# Patient Record
Sex: FEMALE | Race: OTHER | HISPANIC OR LATINO | Employment: FULL TIME | URBAN - METROPOLITAN AREA
[De-identification: names, ages, dates, MRNs, and addresses within clinical notes are randomized per-mention and may not be internally consistent; named-entity substitution may affect disease eponyms.]

---

## 2023-10-19 ENCOUNTER — PHARMACY VISIT (OUTPATIENT)
Dept: PHARMACY | Facility: CLINIC | Age: 48
End: 2023-10-19
Payer: COMMERCIAL

## 2023-10-19 PROCEDURE — RXMED WILLOW AMBULATORY MEDICATION CHARGE

## 2023-10-19 NOTE — TELEPHONE ENCOUNTER
LOV: 9/27/23  NOV: 1/09/24  Patient requesting refill on Mounjaro 10mg/week   Script pended below

## 2023-10-20 ENCOUNTER — TELEPHONE (OUTPATIENT)
Dept: ENDOCRINOLOGY | Facility: CLINIC | Age: 48
End: 2023-10-20
Payer: COMMERCIAL

## 2023-10-20 RX ORDER — TIRZEPATIDE 10 MG/.5ML
10 INJECTION, SOLUTION SUBCUTANEOUS
Qty: 2 ML | Refills: 3 | Status: SHIPPED | OUTPATIENT
Start: 2023-10-20 | End: 2024-01-09 | Stop reason: SDUPTHER

## 2023-10-20 NOTE — TELEPHONE ENCOUNTER
Copied from CRM #[id number]. Topic: Appointment Scheduled  >> Oct 17, 2023  3:00 PM Sharmaine MACIEL wrote:  Pt is scheduled for a group appt with Dr. Yen on 01/09/24 and she would like to make that virtual. Please contact to advise.    Patient's appointment changed to virtual

## 2023-10-23 ENCOUNTER — APPOINTMENT (OUTPATIENT)
Dept: RADIOLOGY | Facility: CLINIC | Age: 48
End: 2023-10-23
Payer: COMMERCIAL

## 2023-10-23 ENCOUNTER — HOSPITAL ENCOUNTER (OUTPATIENT)
Dept: VASCULAR MEDICINE | Facility: CLINIC | Age: 48
Discharge: HOME | End: 2023-10-23
Payer: COMMERCIAL

## 2023-10-23 DIAGNOSIS — R42 DIZZINESS AND GIDDINESS: ICD-10-CM

## 2023-10-23 DIAGNOSIS — H54.7 VISION LOSS: Primary | ICD-10-CM

## 2023-10-23 DIAGNOSIS — H54.7 VISION LOSS: ICD-10-CM

## 2023-10-23 PROCEDURE — 93880 EXTRACRANIAL BILAT STUDY: CPT | Performed by: STUDENT IN AN ORGANIZED HEALTH CARE EDUCATION/TRAINING PROGRAM

## 2023-10-23 PROCEDURE — 93880 EXTRACRANIAL BILAT STUDY: CPT

## 2023-10-24 ENCOUNTER — APPOINTMENT (OUTPATIENT)
Dept: OPHTHALMOLOGY | Facility: CLINIC | Age: 48
End: 2023-10-24
Payer: COMMERCIAL

## 2023-11-22 ENCOUNTER — OFFICE VISIT (OUTPATIENT)
Dept: ORTHOPEDIC SURGERY | Facility: CLINIC | Age: 48
End: 2023-11-22
Payer: COMMERCIAL

## 2023-11-22 DIAGNOSIS — M17.0 BILATERAL PRIMARY OSTEOARTHRITIS OF KNEE: Primary | ICD-10-CM

## 2023-11-22 PROCEDURE — 1036F TOBACCO NON-USER: CPT | Performed by: PHYSICIAN ASSISTANT

## 2023-11-22 PROCEDURE — 20610 DRAIN/INJ JOINT/BURSA W/O US: CPT | Mod: 50 | Performed by: PHYSICIAN ASSISTANT

## 2023-11-22 PROCEDURE — 2500000004 HC RX 250 GENERAL PHARMACY W/ HCPCS (ALT 636 FOR OP/ED): Performed by: PHYSICIAN ASSISTANT

## 2023-11-22 PROCEDURE — 20610 DRAIN/INJ JOINT/BURSA W/O US: CPT | Performed by: PHYSICIAN ASSISTANT

## 2023-11-22 RX ORDER — TRIAMCINOLONE ACETONIDE 40 MG/ML
40 INJECTION, SUSPENSION INTRA-ARTICULAR; INTRAMUSCULAR
Status: COMPLETED | OUTPATIENT
Start: 2023-11-22 | End: 2023-11-22

## 2023-11-22 RX ADMIN — TRIAMCINOLONE ACETONIDE 40 MG: 40 INJECTION, SUSPENSION INTRA-ARTICULAR; INTRAMUSCULAR at 08:21

## 2023-11-22 NOTE — PROGRESS NOTES
Subjective    Patient ID: Niya Guerrero is a 48 y.o. female.    Chief Complaint: B/L knee pain         HPI:  Niya Guerrero is a 48 y.o. female with known osteoarthritis in both knees.  She has started having pain again that is worse with activity.  She is requesting repeat intra-articular steroid injections.    ROS  Constitutional: No fever, no chills, not feeling tired, no recent weight gain and no recent weight loss  ENT: No nosebleeds  Cardiovascular: No chest pain  Respiratory: No shortness of breath and no cough  Gastrointestinal: No abdominal pain, no nausea, no diarrhea, and no vomiting  Musculoskeletal: No arthralgias  Integumentary: No rashes and no skin lesions  Neurological: No headache  Psychiatric: No sleep disturbances no depression  Endocrine: No muscle weakness and no muscle cramps  Hematologic/lymphatic: No swelling glands and no tendency for easy bruising    Past Medical History:   Diagnosis Date    Obstructive sleep apnea (adult) (pediatric) 02/23/2017    Obstructive sleep apnea    Personal history of diseases of the skin and subcutaneous tissue 02/04/2015    History of urticaria    Personal history of other diseases of the musculoskeletal system and connective tissue 02/04/2015    History of arthritis    Personal history of other endocrine, nutritional and metabolic disease 01/22/2015    History of hypokalemia    Sprain of medial collateral ligament of unspecified knee, initial encounter 04/22/2014    Tear of MCL (medial collateral ligament) of knee    Unspecified tear of unspecified meniscus, current injury, unspecified knee, initial encounter 04/22/2014    Meniscus tear        Past Surgical History:   Procedure Laterality Date    HYSTEROSCOPY  01/22/2015    Hysteroscopy With Endometrial Ablation    KNEE ARTHROSCOPY W/ DEBRIDEMENT  04/22/2014    Knee Arthroscopy (Therapeutic)          Current Outpatient Medications:     atorvastatin (Lipitor) 20 mg tablet, TAKE 1 TABLET BY MOUTH DAILY AS DIRECTED,  Disp: 90 tablet, Rfl: 3    metFORMIN XR (Glucophage-XR) 500 mg 24 hr tablet, TAKE 3 TABLETS BY MOUTH DAILY AS DIRECTED, Disp: 270 tablet, Rfl: 3    tirzepatide (Mounjaro) 10 mg/0.5 mL pen injector, Inject 10 mg (0.5 mL) under the skin every 7 days., Disp: 2 mL, Rfl: 3    tirzepatide 10 mg/0.5 mL pen injector, INJECT 10 MG UNDER THE SKIN ONCE A WEEK, Disp: 8 mL, Rfl: 3    tirzepatide 10 mg/0.5 mL pen injector, INJECT 10 MG UNDER THE SKIN ONCE WEEKLY, Disp: 6 mL, Rfl: 1     Not on File     Social Connections: Not on file          Objective   48-year-old female well appearing in no acute distress. Alert and oriented ×3.  Skin intact bilateral lower extremities.   Normal tandem gait. Coordination and balance intact.  Bilateral lower extremity compartments supple.  2+ DP/PT pulses bilaterally.  Injection site both knees is clear    L Inj/Asp: bilateral knee on 11/22/2023 8:21 AM  Indications: pain  Details: 22 G needle, superolateral approach  Medications (Right): 40 mg triamcinolone acetonide 40 mg/mL  Medications (Left): 40 mg triamcinolone acetonide 40 mg/mL      Right knee intra-articular injection is offered for therapeutic purposes.  Indication is knee pain.  Risks and benefits of the injection were discussed.  After questions were answered, consent was provided to proceed.  Under sterile conditions, the knee was injected through a superolateral patellar site with 40 mg Kenalog and 5 cc lidocaine without complication.  The patient tolerated the injection well.  A dressing was applied.  Post injection instructions were given.    Left knee intra-articular injection is offered for therapeutic purposes.  Indication is knee pain.  Risks and benefits of the injection were discussed.  After questions were answered, consent was provided to proceed.  Under sterile conditions, the knee was injected through a superolateral patellar site with 40 mg Kenalog and 5 cc lidocaine without complication.  The patient tolerated the  injection well.  A dressing was applied.  Post injection instructions were given.                Assessment/Plan   Encounter Diagnoses:  Bilateral knee osteoarthritis    Hopefully today's injections will provide her with some relief.  We discussed that we can repeat injections in 3 months if needed.  She will call with any questions or concerns.    This office note was dictated using Dragon voice to text software and was not proofread for spelling or grammatical errors

## 2023-12-29 DIAGNOSIS — E11.9 DIABETES MELLITUS TYPE II, NON INSULIN DEPENDENT (MULTI): Primary | ICD-10-CM

## 2023-12-29 RX ORDER — METFORMIN HYDROCHLORIDE 500 MG/1
TABLET, EXTENDED RELEASE ORAL
Qty: 270 TABLET | Refills: 3 | Status: SHIPPED | OUTPATIENT
Start: 2023-12-29 | End: 2024-12-27

## 2024-01-08 NOTE — PROGRESS NOTES
History of Present IllnessMsJeimy BARBER is a 48 year year old female with a history of T2DM, HLD, GERD, eczema, s/p knee surgeries bilaterally, MELANI who presents for weight management.     VIRTUAL VISIT:  Consult by Dr. Jonny Shipley.  she works as a supervisor for heart/vascular scheduling.     1. Nutrition: working on Mediterranean diet. Still using portion plate. Did not over indulge over the holidays. Trying to increase new vegetables into her diet.     2. Sleep: stable      3. Stress: Has had less stress at work      4. Exercise: walking the dog at the park or stores     5. Appetite control: controlled   AOM currently taking: Mounjaro 10mg weekly- Has T2DM     6. Goal: Try more new vegetables      Current Outpatient Medications:     atorvastatin (Lipitor) 20 mg tablet, TAKE 1 TABLET BY MOUTH DAILY AS DIRECTED, Disp: 90 tablet, Rfl: 3    metFORMIN XR (Glucophage-XR) 500 mg 24 hr tablet, TAKE 3 TABLETS BY MOUTH DAILY AS DIRECTED, Disp: 270 tablet, Rfl: 3    tirzepatide (Mounjaro) 10 mg/0.5 mL pen injector, Inject 10 mg (0.5 mL) under the skin every 7 days., Disp: 2 mL, Rfl: 3    tirzepatide 10 mg/0.5 mL pen injector, INJECT 10 MG UNDER THE SKIN ONCE A WEEK, Disp: 8 mL, Rfl: 3    tirzepatide 10 mg/0.5 mL pen injector, INJECT 10 MG UNDER THE SKIN ONCE WEEKLY, Disp: 6 mL, Rfl: 1    ROS:  System: normal  Eyes : no visual changes  Neck : no tenderness, no new lumps/bumps  Respiratory : no SOB  Cardiovascular : no chest pain, no palpitations  Gastro-Intestinal : no abdominal concerns  Neurological : no numbness or tingling in the extremities  Musculoskeletal : no joint paint, no muscle pain  Skin : no unusual rashes  Psychiatric : no depression, no anxiety  See HPI for Endocrine ROS    Past Medical History:   Diagnosis Date    Obstructive sleep apnea (adult) (pediatric) 02/23/2017    Obstructive sleep apnea    Personal history of diseases of the skin and subcutaneous tissue 02/04/2015    History of urticaria    Personal  history of other diseases of the musculoskeletal system and connective tissue 02/04/2015    History of arthritis    Personal history of other endocrine, nutritional and metabolic disease 01/22/2015    History of hypokalemia    Sprain of medial collateral ligament of unspecified knee, initial encounter 04/22/2014    Tear of MCL (medial collateral ligament) of knee    Unspecified tear of unspecified meniscus, current injury, unspecified knee, initial encounter 04/22/2014    Meniscus tear       Past Surgical History:   Procedure Laterality Date    HYSTEROSCOPY  01/22/2015    Hysteroscopy With Endometrial Ablation    KNEE ARTHROSCOPY W/ DEBRIDEMENT  04/22/2014    Knee Arthroscopy (Therapeutic)       Social History     Socioeconomic History    Marital status:      Spouse name: Not on file    Number of children: Not on file    Years of education: Not on file    Highest education level: Not on file   Occupational History    Not on file   Tobacco Use    Smoking status: Never    Smokeless tobacco: Never   Substance and Sexual Activity    Alcohol use: Not on file    Drug use: Not on file    Sexual activity: Not on file   Other Topics Concern    Not on file   Social History Narrative    Not on file     Social Determinants of Health     Financial Resource Strain: Not on file   Food Insecurity: Not on file   Transportation Needs: Not on file   Physical Activity: Not on file   Stress: Not on file   Social Connections: Not on file   Intimate Partner Violence: Not on file   Housing Stability: Not on file       Objective    Physical Exam:  There were no vitals taken for this visit.  General : alert and oriented X3, no acute distress  Eyes : EOMI     Provider Impressions     Ms. BARBER is a 48 year year old female with a history of T2DM, HLD, GERD, eczema, s/p knee surgeries bilaterally, MELANI who presents for weight management.     Consult by Dr. Jonny Shipley.  she works as a supervisor for heart/vascular scheduling.     was  placed on Ozempic - and initially it did help with weight loss.  initially was 227lb, and then went down to 195lb.  she is now stuck there.     1. Weight Management : Reviewed the principles of energy metabolism, caloric intake and expenditure, and rationale for a treatment program. Also reinforced need for reduced calorie, low fat diet and increased physical activity.     We reviewed the possibility of starting an interdisciplinary lifestyle intervention program involving improvement of the diet, a personalized exercise program, efforts to reduce the stress and the possibility of using appetite suppressant medications in an effort to help with the weight loss process. The patient expressed interest in the plan.     2. Nutrition : I discussed trying one of the diet approaches we have here in the program : Mediterranean lifestyle, ketosis diet.  consult to Taylor : Medit diet -- saw on 4/19/23.  is eating premade salads for lunch -- so is consistent with that.     3/29/23 : 199lb, 33.16kg/m2  5/9/23 : 193lb, 32.2kg/m2  6/14/23 : 192lb, 31.97kg/m2  7/26/23: 182.8lb, 30.37kg/m2  9/27/23: 176.6lb, 29.35kg/m2  1/9/24: 171.8lb, 28.59kg/m2     3. Sleep : is fine.     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  Stress is better.     5. Exercise : not as much exercise this month.     6. Appetite : stable.     7. DM : Managed by Dr. Shipley.  switched from ozempic 2mg/wk to Mounjaro now on 10mg/wk     8. Goal : to add more veggies.     FU in a group visit.  Philip Peres MD

## 2024-01-09 ENCOUNTER — TELEPHONE (OUTPATIENT)
Dept: PHARMACY | Facility: HOSPITAL | Age: 49
End: 2024-01-09

## 2024-01-09 ENCOUNTER — OFFICE VISIT (OUTPATIENT)
Dept: ENDOCRINOLOGY | Facility: CLINIC | Age: 49
End: 2024-01-09
Payer: COMMERCIAL

## 2024-01-09 VITALS — WEIGHT: 171.8 LBS | HEIGHT: 65 IN | BODY MASS INDEX: 28.62 KG/M2

## 2024-01-09 DIAGNOSIS — E66.3 OVERWEIGHT: ICD-10-CM

## 2024-01-09 DIAGNOSIS — Z76.89 ENCOUNTER FOR WEIGHT MANAGEMENT: ICD-10-CM

## 2024-01-09 PROCEDURE — 1036F TOBACCO NON-USER: CPT | Performed by: INTERNAL MEDICINE

## 2024-01-09 PROCEDURE — 3008F BODY MASS INDEX DOCD: CPT | Performed by: INTERNAL MEDICINE

## 2024-01-09 PROCEDURE — 99215 OFFICE O/P EST HI 40 MIN: CPT | Performed by: INTERNAL MEDICINE

## 2024-01-09 RX ORDER — BISMUTH SUBSALICYLATE 262 MG
1 TABLET,CHEWABLE ORAL DAILY
COMMUNITY

## 2024-01-09 NOTE — TELEPHONE ENCOUNTER
[facility] ([place])    Niya Guerrero is a 48 y.o. female was contacted by Clinical Pharmacy Team to complete a comprehensive medication review (CMR) with a pharmacist as part of the [employer] diabetes program.      No Known Allergies    [facility]  [address], [address]  [facility] [address]  Phone: [phone] Fax: [phone]    Updated patient medications and allergy list. Patient reported FBG readings ranging from 86-93, and 130 after lunch.       LAB  Lab Results   Component Value Date    BILITOT 0.6 02/01/2023    CALCIUM 9.9 02/01/2023    CO2 30 02/01/2023     02/01/2023    CREATININE 0.75 02/01/2023    GLUCOSE 94 02/01/2023    ALKPHOS 91 02/01/2023    K 4.2 02/01/2023    PROT 7.7 02/01/2023     02/01/2023    AST 15 02/01/2023    ALT 26 02/01/2023    BUN 14 02/01/2023    ANIONGAP 10 02/01/2023    ALBUMIN 4.2 02/01/2023    GFRF >90 02/01/2023     Lab Results   Component Value Date    TRIG 116 02/01/2023    CHOL 168 02/01/2023    HDL 46.3 02/01/2023     Lab Results   Component Value Date    HGBA1C 5.4 02/01/2023       Current Outpatient Medications on File Prior to Visit   Medication Sig Dispense Refill    atorvastatin (Lipitor) 20 mg tablet TAKE 1 TABLET BY MOUTH DAILY AS DIRECTED 90 tablet 3    metFORMIN XR (Glucophage-XR) 500 mg 24 hr tablet TAKE 3 TABLETS BY MOUTH DAILY AS DIRECTED 270 tablet 3    tirzepatide 10 mg/0.5 mL pen injector INJECT 10 MG UNDER THE SKIN ONCE WEEKLY 6 mL 1    [DISCONTINUED] tirzepatide (Mounjaro) 10 mg/0.5 mL pen injector Inject 10 mg (0.5 mL) under the skin every 7 days. 2 mL 3    [DISCONTINUED] tirzepatide 10 mg/0.5 mL pen injector INJECT 10 MG UNDER THE SKIN ONCE A WEEK 8 mL 3     No current facility-administered medications on file prior to visit.        HISTORICAL DIABETES PHARMACOTHERAPY  -Metformin XR: Patient reported stomach upset. Therapy completed, blood glucose controlled  with Mounjaro.   -Ozempic: Discontinued due to switch to Mounjaro    SECONDARY PREVENTION  - Statin? yes; atorvastatin 20mg  - ACE-I/ARB? no    ASSESSMENT/PLAN:  - Patient enrolled in  Employee diabetes program for $0 co-pays on diabetes medications/supplies. Enrollment should be active in 2-4 weeks   - Requested VBID enrollment date: 1/9/24  - PharmD Management Level: 1    PROBLEM LIST ITEMS ADDRESSED THIS VISIT   -Diabetes   - Continue Moujaro 10mg/0.5ml once weekly   - Continue to monitor blood glucose daily   - Follow up with PCP as directed    Follow up with Clinical Pharmacy Team 1 year for VBID re-enrollment    Continue all meds under the continuation of care with the referring provider and clinical pharmacy team.    Please reach out to the Clinical Pharmacy Team if there are any further questions.     Verbal consent to manage patient's drug therapy was obtained from patient. They were informed they may decline to participate or withdraw from participation in pharmacy services at any time.    Luana Harrison, PharmD  PGY-1 Pharmacy Resident  [phone]

## 2024-01-10 ENCOUNTER — PHARMACY VISIT (OUTPATIENT)
Dept: PHARMACY | Facility: CLINIC | Age: 49
End: 2024-01-10
Payer: COMMERCIAL

## 2024-01-10 PROCEDURE — RXMED WILLOW AMBULATORY MEDICATION CHARGE

## 2024-01-14 PROCEDURE — RXMED WILLOW AMBULATORY MEDICATION CHARGE

## 2024-01-15 ENCOUNTER — PHARMACY VISIT (OUTPATIENT)
Dept: PHARMACY | Facility: CLINIC | Age: 49
End: 2024-01-15
Payer: COMMERCIAL

## 2024-01-31 RX ORDER — TIRZEPATIDE 12.5 MG/.5ML
12.5 INJECTION, SOLUTION SUBCUTANEOUS
Qty: 6 ML | Refills: 2 | Status: SHIPPED | OUTPATIENT
Start: 2024-01-31 | End: 2024-02-01 | Stop reason: SDUPTHER

## 2024-01-31 NOTE — TELEPHONE ENCOUNTER
LOV: 1/9/24  NOV: 4/10/24  Patient on Mounjrao 10mg/week  Patient does not feel effects like before   Patient has been on current dose for around 6 months  Patient asking to increase to 12.5mg  Mounjaro 12.5mg pended below, please review DX

## 2024-02-01 DIAGNOSIS — E11.9 DIABETES MELLITUS TYPE 2, NONINSULIN DEPENDENT (MULTI): ICD-10-CM

## 2024-02-02 ENCOUNTER — PHARMACY VISIT (OUTPATIENT)
Dept: PHARMACY | Facility: CLINIC | Age: 49
End: 2024-02-02
Payer: COMMERCIAL

## 2024-02-02 PROCEDURE — RXMED WILLOW AMBULATORY MEDICATION CHARGE

## 2024-02-02 RX ORDER — TIRZEPATIDE 12.5 MG/.5ML
12.5 INJECTION, SOLUTION SUBCUTANEOUS
Qty: 6 ML | Refills: 2 | Status: SHIPPED | OUTPATIENT
Start: 2024-02-02

## 2024-02-28 PROCEDURE — RXMED WILLOW AMBULATORY MEDICATION CHARGE

## 2024-02-29 ENCOUNTER — PHARMACY VISIT (OUTPATIENT)
Dept: PHARMACY | Facility: CLINIC | Age: 49
End: 2024-02-29
Payer: COMMERCIAL

## 2024-03-06 ENCOUNTER — OFFICE VISIT (OUTPATIENT)
Dept: ORTHOPEDIC SURGERY | Facility: HOSPITAL | Age: 49
End: 2024-03-06
Payer: COMMERCIAL

## 2024-03-06 VITALS — HEIGHT: 65 IN | BODY MASS INDEX: 28.49 KG/M2 | WEIGHT: 171 LBS

## 2024-03-06 DIAGNOSIS — M17.0 BILATERAL PRIMARY OSTEOARTHRITIS OF KNEE: Primary | ICD-10-CM

## 2024-03-06 PROCEDURE — 2500000004 HC RX 250 GENERAL PHARMACY W/ HCPCS (ALT 636 FOR OP/ED): Performed by: PHYSICIAN ASSISTANT

## 2024-03-06 PROCEDURE — 20610 DRAIN/INJ JOINT/BURSA W/O US: CPT | Performed by: PHYSICIAN ASSISTANT

## 2024-03-06 PROCEDURE — 3008F BODY MASS INDEX DOCD: CPT | Performed by: PHYSICIAN ASSISTANT

## 2024-03-06 PROCEDURE — 1036F TOBACCO NON-USER: CPT | Performed by: PHYSICIAN ASSISTANT

## 2024-03-06 PROCEDURE — 20610 DRAIN/INJ JOINT/BURSA W/O US: CPT | Mod: 50 | Performed by: PHYSICIAN ASSISTANT

## 2024-03-06 RX ORDER — TRIAMCINOLONE ACETONIDE 40 MG/ML
40 INJECTION, SUSPENSION INTRA-ARTICULAR; INTRAMUSCULAR
Status: COMPLETED | OUTPATIENT
Start: 2024-03-06 | End: 2024-03-06

## 2024-03-06 RX ADMIN — TRIAMCINOLONE ACETONIDE 40 MG: 400 INJECTION, SUSPENSION INTRA-ARTICULAR; INTRAMUSCULAR at 08:44

## 2024-03-06 ASSESSMENT — PAIN - FUNCTIONAL ASSESSMENT: PAIN_FUNCTIONAL_ASSESSMENT: 0-10

## 2024-03-06 ASSESSMENT — PAIN SCALES - GENERAL: PAINLEVEL_OUTOF10: 4

## 2024-03-06 ASSESSMENT — PAIN DESCRIPTION - DESCRIPTORS: DESCRIPTORS: ACHING

## 2024-03-06 NOTE — PROGRESS NOTES
Subjective    Patient ID: Niya Guerrero is a 48 y.o. female.    Chief Complaint: Follow-up of the Right Knee (DOLI: 11/22/23) and Follow-up of the Left Knee (DOLI: 11/22/23)           HPI:  Niya Guerrero is a 48 y.o. female with known degenerative changes in both knees who returns today for bilateral knee pain.  She last received injections back in November and notes about 3 months of improvement.  She is requesting repeat injections today.    ROS  Constitutional: No fever, no chills, not feeling tired, no recent weight gain and no recent weight loss  ENT: No nosebleeds  Cardiovascular: No chest pain  Respiratory: No shortness of breath and no cough  Gastrointestinal: No abdominal pain, no nausea, no diarrhea, and no vomiting  Musculoskeletal: No arthralgias  Integumentary: No rashes and no skin lesions  Neurological: No headache  Psychiatric: No sleep disturbances no depression  Endocrine: No muscle weakness and no muscle cramps  Hematologic/lymphatic: No swelling glands and no tendency for easy bruising    Past Medical History:   Diagnosis Date    Obstructive sleep apnea (adult) (pediatric) 02/23/2017    Obstructive sleep apnea    Personal history of diseases of the skin and subcutaneous tissue 02/04/2015    History of urticaria    Personal history of other diseases of the musculoskeletal system and connective tissue 02/04/2015    History of arthritis    Personal history of other endocrine, nutritional and metabolic disease 01/22/2015    History of hypokalemia    Sprain of medial collateral ligament of unspecified knee, initial encounter 04/22/2014    Tear of MCL (medial collateral ligament) of knee    Unspecified tear of unspecified meniscus, current injury, unspecified knee, initial encounter 04/22/2014    Meniscus tear        Past Surgical History:   Procedure Laterality Date    HYSTEROSCOPY  01/22/2015    Hysteroscopy With Endometrial Ablation    KNEE ARTHROSCOPY W/ DEBRIDEMENT  04/22/2014    Knee Arthroscopy  (Therapeutic)          Current Outpatient Medications:     atorvastatin (Lipitor) 20 mg tablet, TAKE 1 TABLET BY MOUTH DAILY AS DIRECTED, Disp: 90 tablet, Rfl: 3    metFORMIN XR (Glucophage-XR) 500 mg 24 hr tablet, TAKE 3 TABLETS BY MOUTH DAILY AS DIRECTED, Disp: 270 tablet, Rfl: 3    multivitamin tablet, Take 1 tablet by mouth once daily., Disp: , Rfl:     tirzepatide (Mounjaro) 12.5 mg/0.5 mL pen injector, Inject 12.5 mg under the skin every 7 days., Disp: 6 mL, Rfl: 2    tirzepatide 10 mg/0.5 mL pen injector, INJECT 10 MG UNDER THE SKIN ONCE WEEKLY, Disp: 6 mL, Rfl: 1     Allergies   Allergen Reactions    Penicillin Hives        Social Connections: Not on file          Objective   48-year-old female well appearing in no acute distress. Alert and oriented ×3.  Skin intact bilateral lower extremities.   Normal tandem gait. Coordination and balance intact.  Bilateral lower extremity compartments supple.  2+ DP/PT pulses bilaterally.  Injection site both knees is clear    L Inj/Asp: bilateral knee on 3/6/2024 8:44 AM  Indications: pain  Details: 22 G needle, superolateral approach  Medications (Right): 40 mg triamcinolone acetonide 40 mg/mL  Medications (Left): 40 mg triamcinolone acetonide 40 mg/mL      Knee injection:    Right knee intra-articular injection is offered for therapeutic purposes.  Indication is knee pain.  Risks and benefits of the injection were discussed.  After questions were answered, consent was provided to proceed.  Under sterile conditions, the knee was injected through a superolateral patellar site with 40 mg Kenalog and 5 cc lidocaine without complication.  The patient tolerated the injection well.  A dressing was applied.  Post injection instructions were given.    Left knee intra-articular injection is offered for therapeutic purposes.  Indication is knee pain.  Risks and benefits of the injection were discussed.  After questions were answered, consent was provided to proceed.  Under  sterile conditions, the knee was injected through a superolateral patellar site with 40 mg Kenalog and 5 cc lidocaine without complication.  The patient tolerated the injection well.  A dressing was applied.  Post injection instructions were given.            Assessment/Plan   Encounter Diagnoses:  Bilateral primary osteoarthritis of knee    No orders of the defined types were placed in this encounter.      Hopefully she will have improvement with today's injections.  We discussed other options including viscosupplementation and PRP.  Should she see less than 3 months of improvement with today's procedures will consider the other options.  She will call with any questions or concerns.    This office note was dictated using Dragon voice to text software and was not proofread for spelling or grammatical errors

## 2024-03-21 PROCEDURE — RXMED WILLOW AMBULATORY MEDICATION CHARGE

## 2024-03-23 ENCOUNTER — PHARMACY VISIT (OUTPATIENT)
Dept: PHARMACY | Facility: CLINIC | Age: 49
End: 2024-03-23
Payer: COMMERCIAL

## 2024-04-09 NOTE — PROGRESS NOTES
History of Present IllnessMsJeimy BARBER is a 48 year year old female with a history of T2DM, HLD, GERD, eczema, s/p knee surgeries bilaterally, MELANI who presents for weight management.     VIRTUAL VISIT:  Consult by Dr. Jonny Shipley.  she works as a supervisor for heart/vascular scheduling.     1. Nutrition: working on Mediterranean diet. Making different recipes.     2. Sleep: stable       3. Stress: Has had less stress at work       4. Exercise: started kickboxing and bungee exercise classes       5. Appetite control: controlled   AOM currently taking: Mounjaro 12.5mg weekly- Has T2DM      6. Goal: stay consistent with workouts and incorporate more vegetables into diet.       Current Outpatient Medications:     atorvastatin (Lipitor) 20 mg tablet, TAKE 1 TABLET BY MOUTH DAILY AS DIRECTED, Disp: 90 tablet, Rfl: 3    metFORMIN XR (Glucophage-XR) 500 mg 24 hr tablet, TAKE 3 TABLETS BY MOUTH DAILY AS DIRECTED, Disp: 270 tablet, Rfl: 3    multivitamin tablet, Take 1 tablet by mouth once daily., Disp: , Rfl:     tirzepatide (Mounjaro) 12.5 mg/0.5 mL pen injector, Inject 12.5 mg under the skin every 7 days., Disp: 6 mL, Rfl: 2    tirzepatide 10 mg/0.5 mL pen injector, INJECT 10 MG UNDER THE SKIN ONCE WEEKLY, Disp: 6 mL, Rfl: 1    ROS:  System: normal  Eyes : no visual changes  Neck : no tenderness, no new lumps/bumps  Respiratory : no SOB  Cardiovascular : no chest pain, no palpitations  Gastro-Intestinal : no abdominal concerns  Neurological : no numbness or tingling in the extremities  Musculoskeletal : no joint paint, no muscle pain  Skin : no unusual rashes  Psychiatric : no depression, no anxiety  See HPI for Endocrine ROS    Past Medical History:   Diagnosis Date    Obstructive sleep apnea (adult) (pediatric) 02/23/2017    Obstructive sleep apnea    Personal history of diseases of the skin and subcutaneous tissue 02/04/2015    History of urticaria    Personal history of other diseases of the musculoskeletal system and  connective tissue 02/04/2015    History of arthritis    Personal history of other endocrine, nutritional and metabolic disease 01/22/2015    History of hypokalemia    Sprain of medial collateral ligament of unspecified knee, initial encounter 04/22/2014    Tear of MCL (medial collateral ligament) of knee    Unspecified tear of unspecified meniscus, current injury, unspecified knee, initial encounter 04/22/2014    Meniscus tear       Past Surgical History:   Procedure Laterality Date    HYSTEROSCOPY  01/22/2015    Hysteroscopy With Endometrial Ablation    KNEE ARTHROSCOPY W/ DEBRIDEMENT  04/22/2014    Knee Arthroscopy (Therapeutic)       Social History     Socioeconomic History    Marital status:      Spouse name: Not on file    Number of children: Not on file    Years of education: Not on file    Highest education level: Not on file   Occupational History    Not on file   Tobacco Use    Smoking status: Never    Smokeless tobacco: Never   Vaping Use    Vaping Use: Never used   Substance and Sexual Activity    Alcohol use: Not Currently    Drug use: Never    Sexual activity: Not on file   Other Topics Concern    Not on file   Social History Narrative    Not on file     Social Determinants of Health     Financial Resource Strain: Not on file   Food Insecurity: Not on file   Transportation Needs: Not on file   Physical Activity: Not on file   Stress: Not on file   Social Connections: Not on file   Intimate Partner Violence: Not on file   Housing Stability: Not on file       Objective    Physical Exam:  There were no vitals taken for this visit.  General : alert and oriented X3, no acute distress  Eyes : EOMI     Provider Impressions     Ms. BARBER is a 48 year year old female with a history of T2DM, HLD, GERD, eczema, s/p knee surgeries bilaterally, MELANI who presents for weight management.     Consult by Dr. Jonny Shipley.  she works as a supervisor for heart/vascular scheduling.     was placed on Ozempic - and  initially it did help with weight loss.  initially was 227lb, and then went down to 195lb.  she is now stuck there.     1. Weight Management : Reviewed the principles of energy metabolism, caloric intake and expenditure, and rationale for a treatment program. Also reinforced need for reduced calorie, low fat diet and increased physical activity.     We reviewed the possibility of starting an interdisciplinary lifestyle intervention program involving improvement of the diet, a personalized exercise program, efforts to reduce the stress and the possibility of using appetite suppressant medications in an effort to help with the weight loss process. The patient expressed interest in the plan.     2. Nutrition : I discussed trying one of the diet approaches we have here in the program : Mediterranean lifestyle, ketosis diet.  consult to Taylor : Medit diet -- saw on 4/19/23.  is eating premade salads for lunch -- so is consistent with that.     3/29/23 : 199lb, 33.16kg/m2  5/9/23 : 193lb, 32.2kg/m2  6/14/23 : 192lb, 31.97kg/m2  7/26/23: 182.8lb, 30.37kg/m2  9/27/23: 176.6lb, 29.35kg/m2  1/9/24: 171.8lb, 28.59kg/m2  4/10/24: 169lb, 28.12kg/m2       3. Sleep : is fine.     4. Stress : works in [employer] scheduling, managed   is , has two daughters, one son.  Stress is better.     5. Exercise : Recently started doing kickboxing and bungi exercise classes!     6. Appetite : stable.     7. DM : Managed by Dr. Shipley.  switched from ozempic 2mg/wk to Mounjaro now on 12.5mg/wk     8. Goal : to be consistent with her workouts.     FU in a group visit.  Philip Peres MD

## 2024-04-10 ENCOUNTER — OFFICE VISIT (OUTPATIENT)
Dept: ENDOCRINOLOGY | Facility: CLINIC | Age: 49
End: 2024-04-10
Payer: COMMERCIAL

## 2024-04-10 VITALS — BODY MASS INDEX: 28.16 KG/M2 | WEIGHT: 169 LBS | HEIGHT: 65 IN

## 2024-04-10 DIAGNOSIS — E66.3 OVERWEIGHT: ICD-10-CM

## 2024-04-10 DIAGNOSIS — Z76.89 ENCOUNTER FOR WEIGHT MANAGEMENT: ICD-10-CM

## 2024-04-10 PROCEDURE — 99215 OFFICE O/P EST HI 40 MIN: CPT | Performed by: INTERNAL MEDICINE

## 2024-04-10 PROCEDURE — 3008F BODY MASS INDEX DOCD: CPT | Performed by: INTERNAL MEDICINE

## 2024-04-13 DIAGNOSIS — E78.5 HYPERLIPIDEMIA, UNSPECIFIED HYPERLIPIDEMIA TYPE: Primary | ICD-10-CM

## 2024-04-13 RX ORDER — ATORVASTATIN CALCIUM 20 MG/1
20 TABLET, FILM COATED ORAL DAILY
Qty: 90 TABLET | Refills: 3 | Status: SHIPPED | OUTPATIENT
Start: 2024-04-13 | End: 2025-04-12

## 2024-04-14 PROCEDURE — RXMED WILLOW AMBULATORY MEDICATION CHARGE

## 2024-04-17 ENCOUNTER — PHARMACY VISIT (OUTPATIENT)
Dept: PHARMACY | Facility: CLINIC | Age: 49
End: 2024-04-17
Payer: COMMERCIAL

## 2024-04-18 PROCEDURE — RXMED WILLOW AMBULATORY MEDICATION CHARGE

## 2024-04-20 ENCOUNTER — PHARMACY VISIT (OUTPATIENT)
Dept: PHARMACY | Facility: CLINIC | Age: 49
End: 2024-04-20
Payer: COMMERCIAL

## 2024-05-07 NOTE — PROGRESS NOTES
Subjective   Ms. BARBER is a 48 year year old female with a history of T2DM, HLD, GERD, eczema, s/p knee surgeries bilaterally, MELANI who presents for weight management.     VIRTUAL VISIT:  Consult by Dr. Jonny Shipley.  she works as a supervisor for heart/vascular scheduling.     1. Nutrition: working on Mediterranean diet.     2. Sleep: stable       3. Stress: Has had less stress at work       4. Exercise: Still going to k[employer] classes and walking more     5. Appetite control: controlled   AOM currently taking: Mounjaro 12.5mg weekly- Has T2DM      6. Goal: stay consistent       Current Outpatient Medications:     atorvastatin (Lipitor) 20 mg tablet, TAKE 1 TABLET BY MOUTH DAILY AS DIRECTED, Disp: 90 tablet, Rfl: 3    metFORMIN XR (Glucophage-XR) 500 mg 24 hr tablet, TAKE 3 TABLETS BY MOUTH DAILY AS DIRECTED, Disp: 270 tablet, Rfl: 3    multivitamin tablet, Take 1 tablet by mouth once daily., Disp: , Rfl:     tirzepatide (Mounjaro) 12.5 mg/0.5 mL pen injector, Inject 12.5 mg under the skin every 7 days., Disp: 6 mL, Rfl: 2    ROS:  System: normal  Eyes : no visual changes  Neck : no tenderness, no new lumps/bumps  Respiratory : no SOB  Cardiovascular : no chest pain, no palpitations  Gastro-Intestinal : no abdominal concerns  Neurological : no numbness or tingling in the extremities  Musculoskeletal : no joint paint, no muscle pain  Skin : no unusual rashes  Psychiatric : no depression, no anxiety  See HPI for Endocrine ROS    Past Medical History:   Diagnosis Date    Obstructive sleep apnea (adult) (pediatric) 02/23/2017    Obstructive sleep apnea    Personal history of diseases of the skin and subcutaneous tissue 02/04/2015    History of urticaria    Personal history of other diseases of the musculoskeletal system and connective tissue 02/04/2015    History of arthritis    Personal history of other endocrine, nutritional and metabolic disease 01/22/2015    History of hypokalemia    Sprain of medial collateral  ligament of unspecified knee, initial encounter 04/22/2014    Tear of MCL (medial collateral ligament) of knee    Unspecified tear of unspecified meniscus, current injury, unspecified knee, initial encounter 04/22/2014    Meniscus tear       Past Surgical History:   Procedure Laterality Date    HYSTEROSCOPY  01/22/2015    Hysteroscopy With Endometrial Ablation    KNEE ARTHROSCOPY W/ DEBRIDEMENT  04/22/2014    Knee Arthroscopy (Therapeutic)       Social History     Socioeconomic History    Marital status:      Spouse name: Not on file    Number of children: Not on file    Years of education: Not on file    Highest education level: Not on file   Occupational History    Not on file   Tobacco Use    Smoking status: Never    Smokeless tobacco: Never   Vaping Use    Vaping status: Never Used   Substance and Sexual Activity    Alcohol use: Not Currently    Drug use: Never    Sexual activity: Not on file   Other Topics Concern    Not on file   Social History Narrative    Not on file     Social Determinants of Health     Financial Resource Strain: Not on file   Food Insecurity: Not on file   Transportation Needs: Not on file   Physical Activity: Not on file   Stress: Not on file   Social Connections: Not on file   Intimate Partner Violence: Not on file   Housing Stability: Not on file       Objective    Physical Exam:  There were no vitals taken for this visit.  General : alert and oriented X3, no acute distress  Eyes : EOMI     Provider Impressions  Ms. BARBER is a 48 year year old female with a history of T2DM, HLD, GERD, eczema, s/p knee surgeries bilaterally, MELANI who presents for weight management.     Consult by Dr. Jonny Shipley.  she works as a supervisor for heart/vascular scheduling.     was placed on Ozempic - and initially it did help with weight loss.  initially was 227lb, and then went down to 195lb.  she is now stuck there.     1. Weight Management : Reviewed the principles of energy metabolism, caloric  intake and expenditure, and rationale for a treatment program. Also reinforced need for reduced calorie, low fat diet and increased physical activity.     We reviewed the possibility of starting an interdisciplinary lifestyle intervention program involving improvement of the diet, a personalized exercise program, efforts to reduce the stress and the possibility of using appetite suppressant medications in an effort to help with the weight loss process. The patient expressed interest in the plan.     2. Nutrition : I discussed trying one of the diet approaches we have here in the program : Mediterranean lifestyle, ketosis diet.  consult to Taylor : Medit diet -- saw on 4/19/23.  is eating premade salads for lunch -- so is consistent with that.  Buys these at [employer]!     3/29/23 : 199lb, 33.16kg/m2  5/9/23 : 193lb, 32.2kg/m2  6/14/23 : 192lb, 31.97kg/m2  7/26/23: 182.8lb, 30.37kg/m2  9/27/23: 176.6lb, 29.35kg/m2  1/9/24: 171.8lb, 28.59kg/m2  4/10/24: 169lb, 28.12kg/m2  5/8/24: 169lb, 28.12kg/m2     3. Sleep : is fine.     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  Stress is better.     5. Exercise : Recently started doing kickboxing and bungi exercise classes!     6. Appetite : stable.     7. DM : Managed by Dr. Shipley.  switched from ozempic 2mg/wk to Mounjaro now on 12.5mg/wk     8. Goal : to be consistent, as she is happy with her weight and wants to maintain now.     FU in a group visit.  Philip Peres MD

## 2024-05-08 ENCOUNTER — OFFICE VISIT (OUTPATIENT)
Dept: ENDOCRINOLOGY | Facility: CLINIC | Age: 49
End: 2024-05-08
Payer: COMMERCIAL

## 2024-05-08 VITALS — BODY MASS INDEX: 28.16 KG/M2 | WEIGHT: 169 LBS | HEIGHT: 65 IN

## 2024-05-08 DIAGNOSIS — E66.3 OVERWEIGHT: ICD-10-CM

## 2024-05-08 DIAGNOSIS — Z76.89 ENCOUNTER FOR WEIGHT MANAGEMENT: ICD-10-CM

## 2024-05-08 PROCEDURE — 3008F BODY MASS INDEX DOCD: CPT | Performed by: INTERNAL MEDICINE

## 2024-05-08 PROCEDURE — 99215 OFFICE O/P EST HI 40 MIN: CPT | Performed by: INTERNAL MEDICINE

## 2024-05-14 ENCOUNTER — APPOINTMENT (OUTPATIENT)
Dept: OPHTHALMOLOGY | Facility: CLINIC | Age: 49
End: 2024-05-14
Payer: COMMERCIAL

## 2024-05-16 PROCEDURE — RXMED WILLOW AMBULATORY MEDICATION CHARGE

## 2024-05-18 ENCOUNTER — PHARMACY VISIT (OUTPATIENT)
Dept: PHARMACY | Facility: CLINIC | Age: 49
End: 2024-05-18
Payer: COMMERCIAL

## 2024-05-29 ENCOUNTER — TELEPHONE (OUTPATIENT)
Dept: ENDOCRINOLOGY | Facility: CLINIC | Age: 49
End: 2024-05-29
Payer: COMMERCIAL

## 2024-05-29 DIAGNOSIS — Z00.00 HEALTHCARE MAINTENANCE: ICD-10-CM

## 2024-05-29 NOTE — TELEPHONE ENCOUNTER
----- Message from Niya Guerrero sent at 5/29/2024  1:51 PM EDT -----  Regarding: Blood work  Contact: [phone]  I was wondering if you are able to order blood work to check A1C.

## 2024-06-08 PROCEDURE — RXMED WILLOW AMBULATORY MEDICATION CHARGE

## 2024-06-11 ENCOUNTER — OFFICE VISIT (OUTPATIENT)
Dept: ENDOCRINOLOGY | Facility: CLINIC | Age: 49
End: 2024-06-11
Payer: COMMERCIAL

## 2024-06-11 VITALS — BODY MASS INDEX: 28.32 KG/M2 | HEIGHT: 65 IN | WEIGHT: 170 LBS

## 2024-06-11 DIAGNOSIS — Z76.89 ENCOUNTER FOR WEIGHT MANAGEMENT: ICD-10-CM

## 2024-06-11 DIAGNOSIS — E66.3 OVERWEIGHT: ICD-10-CM

## 2024-06-11 PROCEDURE — 3008F BODY MASS INDEX DOCD: CPT | Performed by: INTERNAL MEDICINE

## 2024-06-11 PROCEDURE — 99215 OFFICE O/P EST HI 40 MIN: CPT | Performed by: INTERNAL MEDICINE

## 2024-06-11 NOTE — PROGRESS NOTES
Subjective   Ms. BARBER is a 48 year year old female with a history of T2DM, HLD, GERD, eczema, s/p knee surgeries bilaterally, MELANI who presents for weight management.     VIRTUAL VISIT:  Consult by Dr. Jonny Shipley.  she works as a supervisor for heart/vascular scheduling.     1. Nutrition: working on Mediterranean diet. Maintaining portion control with meals.      2. Sleep: stable       3. Stress: Has had less stress at work       4. Exercise: Still going to kickboxing classes, walking more and bungee classes      5. Appetite control: controlled   AOM currently taking: Mounjaro 12.5mg weekly- Has T2DM      6. Goal: yard work             Current Outpatient Medications:     atorvastatin (Lipitor) 20 mg tablet, TAKE 1 TABLET BY MOUTH DAILY AS DIRECTED, Disp: 90 tablet, Rfl: 3    metFORMIN XR (Glucophage-XR) 500 mg 24 hr tablet, TAKE 3 TABLETS BY MOUTH DAILY AS DIRECTED, Disp: 270 tablet, Rfl: 3    multivitamin tablet, Take 1 tablet by mouth once daily., Disp: , Rfl:     tirzepatide (Mounjaro) 12.5 mg/0.5 mL pen injector, Inject 12.5 mg under the skin every 7 days., Disp: 6 mL, Rfl: 2    ROS:  System: normal  Eyes : no visual changes  Neck : no tenderness, no new lumps/bumps  Respiratory : no SOB  Cardiovascular : no chest pain, no palpitations  Gastro-Intestinal : no abdominal concerns  Neurological : no numbness or tingling in the extremities  Musculoskeletal : no joint paint, no muscle pain  Skin : no unusual rashes  Psychiatric : no depression, no anxiety  See HPI for Endocrine ROS    Past Medical History:   Diagnosis Date    Obstructive sleep apnea (adult) (pediatric) 02/23/2017    Obstructive sleep apnea    Personal history of diseases of the skin and subcutaneous tissue 02/04/2015    History of urticaria    Personal history of other diseases of the musculoskeletal system and connective tissue 02/04/2015    History of arthritis    Personal history of other endocrine, nutritional and metabolic disease 01/22/2015     History of hypokalemia    Sprain of medial collateral ligament of unspecified knee, initial encounter 04/22/2014    Tear of MCL (medial collateral ligament) of knee    Unspecified tear of unspecified meniscus, current injury, unspecified knee, initial encounter 04/22/2014    Meniscus tear       Past Surgical History:   Procedure Laterality Date    HYSTEROSCOPY  01/22/2015    Hysteroscopy With Endometrial Ablation    KNEE ARTHROSCOPY W/ DEBRIDEMENT  04/22/2014    Knee Arthroscopy (Therapeutic)       Social History     Socioeconomic History    Marital status:      Spouse name: Not on file    Number of children: Not on file    Years of education: Not on file    Highest education level: Not on file   Occupational History    Not on file   Tobacco Use    Smoking status: Never    Smokeless tobacco: Never   Vaping Use    Vaping status: Never Used   Substance and Sexual Activity    Alcohol use: Not Currently    Drug use: Never    Sexual activity: Not on file   Other Topics Concern    Not on file   Social History Narrative    Not on file     Social Determinants of Health     Financial Resource Strain: Not on file   Food Insecurity: Not on file   Transportation Needs: Not on file   Physical Activity: Not on file   Stress: Not on file   Social Connections: Not on file   Intimate Partner Violence: Not on file   Housing Stability: Not on file       Objective    Physical Exam:  There were no vitals taken for this visit.  General : alert and oriented X3, no acute distress  Eyes : EOMI     Provider Impressions  Ms. BARBER is a 48 year year old female with a history of T2DM, HLD, GERD, eczema, s/p knee surgeries bilaterally, MELANI who presents for weight management.     Consult by Dr. Jonny Shipley.  she works as a supervisor for heart/vascular scheduling.     was placed on Ozempic - and initially it did help with weight loss.  initially was 227lb, and then went down to 195lb.  she is now stuck there.     1. Weight  Management : Reviewed the principles of energy metabolism, caloric intake and expenditure, and rationale for a treatment program. Also reinforced need for reduced calorie, low fat diet and increased physical activity.     We reviewed the possibility of starting an interdisciplinary lifestyle intervention program involving improvement of the diet, a personalized exercise program, efforts to reduce the stress and the possibility of using appetite suppressant medications in an effort to help with the weight loss process. The patient expressed interest in the plan.     2. Nutrition : I discussed trying one of the diet approaches we have here in the program : Mediterranean lifestyle, ketosis diet.  Working on the Medit diet.  Better at portion control!     3/29/23 : 199lb, 33.16kg/m2  5/9/23 : 193lb, 32.2kg/m2  6/14/23 : 192lb, 31.97kg/m2  7/26/23: 182.8lb, 30.37kg/m2  9/27/23: 176.6lb, 29.35kg/m2  1/9/24: 171.8lb, 28.59kg/m2  4/10/24: 169lb, 28.12kg/m2  5/8/24: 169lb, 28.12kg/m2  6/11/24: 170lb, 28.29kg/m2     3. Sleep : is fine.     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  Stress is better.     5. Exercise : Doing kickboxing and bungi exercise classes!     6. Appetite : stable.     7. DM : Managed by Dr. Shipley.  switched from ozempic 2mg/wk to Mounjaro now on 12.5mg/wk     8. Goal : to start doing yard work.     FU in a group visit.  Philip Peres MD

## 2024-06-12 ENCOUNTER — PHARMACY VISIT (OUTPATIENT)
Dept: PHARMACY | Facility: CLINIC | Age: 49
End: 2024-06-12
Payer: COMMERCIAL

## 2024-06-12 ENCOUNTER — OFFICE VISIT (OUTPATIENT)
Dept: ORTHOPEDIC SURGERY | Facility: CLINIC | Age: 49
End: 2024-06-12
Payer: COMMERCIAL

## 2024-06-12 DIAGNOSIS — M17.0 BILATERAL PRIMARY OSTEOARTHRITIS OF KNEE: Primary | ICD-10-CM

## 2024-06-12 PROCEDURE — 1036F TOBACCO NON-USER: CPT | Performed by: PHYSICIAN ASSISTANT

## 2024-06-12 PROCEDURE — 3008F BODY MASS INDEX DOCD: CPT | Performed by: PHYSICIAN ASSISTANT

## 2024-06-12 PROCEDURE — 20610 DRAIN/INJ JOINT/BURSA W/O US: CPT | Performed by: PHYSICIAN ASSISTANT

## 2024-06-12 PROCEDURE — 2500000004 HC RX 250 GENERAL PHARMACY W/ HCPCS (ALT 636 FOR OP/ED): Performed by: PHYSICIAN ASSISTANT

## 2024-06-12 RX ORDER — CYCLOBENZAPRINE HCL 5 MG
TABLET ORAL
COMMUNITY
Start: 2019-03-13

## 2024-06-12 RX ORDER — TRIAMCINOLONE ACETONIDE 40 MG/ML
40 INJECTION, SUSPENSION INTRA-ARTICULAR; INTRAMUSCULAR
Status: COMPLETED | OUTPATIENT
Start: 2024-06-12 | End: 2024-06-12

## 2024-06-12 RX ORDER — MELOXICAM 15 MG/1
TABLET ORAL
COMMUNITY
Start: 2020-12-21

## 2024-06-12 RX ORDER — ERGOCALCIFEROL 1.25 MG/1
CAPSULE ORAL
COMMUNITY
Start: 2017-03-08

## 2024-06-12 NOTE — PROGRESS NOTES
Subjective    Patient ID: Niya Guerrero is a 48 y.o. female.    Chief Complaint: Bilateral knee pain         HPI:  Niya Guerrero is a 48 y.o. female with known degenerative changes in both knees who presents today for a planned intra-articular steroid injection of both knees.  She states that she typically gets a few weeks of improvement following the injections.    ROS  Constitutional: No fever, no chills, not feeling tired, no recent weight gain and no recent weight loss  ENT: No nosebleeds  Cardiovascular: No chest pain  Respiratory: No shortness of breath and no cough  Gastrointestinal: No abdominal pain, no nausea, no diarrhea, and no vomiting  Musculoskeletal: No arthralgias  Integumentary: No rashes and no skin lesions  Neurological: No headache  Psychiatric: No sleep disturbances no depression  Endocrine: No muscle weakness and no muscle cramps  Hematologic/lymphatic: No swelling glands and no tendency for easy bruising    Past Medical History:   Diagnosis Date    Obstructive sleep apnea (adult) (pediatric) 02/23/2017    Obstructive sleep apnea    Personal history of diseases of the skin and subcutaneous tissue 02/04/2015    History of urticaria    Personal history of other diseases of the musculoskeletal system and connective tissue 02/04/2015    History of arthritis    Personal history of other endocrine, nutritional and metabolic disease 01/22/2015    History of hypokalemia    Sprain of medial collateral ligament of unspecified knee, initial encounter 04/22/2014    Tear of MCL (medial collateral ligament) of knee    Unspecified tear of unspecified meniscus, current injury, unspecified knee, initial encounter 04/22/2014    Meniscus tear        Past Surgical History:   Procedure Laterality Date    HYSTEROSCOPY  01/22/2015    Hysteroscopy With Endometrial Ablation    KNEE ARTHROSCOPY W/ DEBRIDEMENT  04/22/2014    Knee Arthroscopy (Therapeutic)          Current Outpatient Medications:     cyclobenzaprine  (Flexeril) 5 mg tablet, Take by mouth., Disp: , Rfl:     ergocalciferol (Vitamin D-2) 1.25 MG (74638 UT) capsule, Take by mouth., Disp: , Rfl:     meloxicam (Mobic) 15 mg tablet, Take by mouth., Disp: , Rfl:     rivaroxaban (Xarelto) 20 mg tablet, Take by mouth., Disp: , Rfl:     semaglutide (Ozempic) 1 mg/dose (2 mg/1.5 mL) pen injector, Inject under the skin., Disp: , Rfl:     sodium hyaluronate (Euflexxa) 10 mg/mL(mw 2.4 -3.6 million) injection, Inject into the joint., Disp: , Rfl:     atorvastatin (Lipitor) 20 mg tablet, TAKE 1 TABLET BY MOUTH DAILY AS DIRECTED, Disp: 90 tablet, Rfl: 3    metFORMIN XR (Glucophage-XR) 500 mg 24 hr tablet, TAKE 3 TABLETS BY MOUTH DAILY AS DIRECTED, Disp: 270 tablet, Rfl: 3    multivitamin tablet, Take 1 tablet by mouth once daily., Disp: , Rfl:     POTASSIUM CHLORIDE ORAL, Take 1 tablet by mouth once daily., Disp: , Rfl:     tirzepatide (Mounjaro) 12.5 mg/0.5 mL pen injector, Inject 12.5 mg under the skin every 7 days., Disp: 6 mL, Rfl: 2     Allergies   Allergen Reactions    Penicillin Hives        Social Connections: Not on file          Objective   48-year-old female well appearing in no acute distress. Alert and oriented ×3.  Skin intact bilateral lower extremities.   Normal tandem gait. Coordination and balance intact.  Bilateral lower extremity compartments supple.  2+ DP/PT pulses bilaterally.  Injection site both knees is clear    L Inj/Asp: bilateral knee on 6/12/2024 8:19 AM  Indications: pain  Details: 22 G needle, superolateral approach  Medications (Right): 40 mg triamcinolone acetonide 40 mg/mL  Medications (Left): 40 mg triamcinolone acetonide 40 mg/mL      Knee injection:    Right knee intra-articular injection is offered for therapeutic purposes.  Indication is knee pain.  Risks and benefits of the injection were discussed.  After questions were answered, consent was provided to proceed.  Under sterile conditions, the knee was injected through a superolateral  patellar site with 40 mg Kenalog and 5 cc lidocaine without complication.  The patient tolerated the injection well.  A dressing was applied.  Post injection instructions were given.    Left knee intra-articular injection is offered for therapeutic purposes.  Indication is knee pain.  Risks and benefits of the injection were discussed.  After questions were answered, consent was provided to proceed.  Under sterile conditions, the knee was injected through a superolateral patellar site with 40 mg Kenalog and 5 cc lidocaine without complication.  The patient tolerated the injection well.  A dressing was applied.  Post injection instructions were given.            Assessment/Plan   Encounter Diagnoses:  Bilateral primary osteoarthritis of knee    No orders of the defined types were placed in this encounter.      Hopefully today's injections provide her with relief.  We discussed that we can repeat them and 3 months if needed.  We reviewed other options including viscosupplementation and PRP which she will consider.    This office note was dictated using Dragon voice to text software and was not proofread for spelling or grammatical errors

## 2024-06-14 ENCOUNTER — APPOINTMENT (OUTPATIENT)
Dept: CARDIOLOGY | Facility: HOSPITAL | Age: 49
End: 2024-06-14
Payer: COMMERCIAL

## 2024-06-20 ENCOUNTER — APPOINTMENT (OUTPATIENT)
Dept: CARDIOLOGY | Facility: CLINIC | Age: 49
End: 2024-06-20
Payer: COMMERCIAL

## 2024-07-06 PROCEDURE — RXMED WILLOW AMBULATORY MEDICATION CHARGE

## 2024-07-11 ENCOUNTER — PHARMACY VISIT (OUTPATIENT)
Dept: PHARMACY | Facility: CLINIC | Age: 49
End: 2024-07-11
Payer: COMMERCIAL

## 2024-07-12 PROCEDURE — RXMED WILLOW AMBULATORY MEDICATION CHARGE

## 2024-07-15 DIAGNOSIS — E11.9 DIABETES MELLITUS TYPE II, NON INSULIN DEPENDENT (MULTI): ICD-10-CM

## 2024-07-15 DIAGNOSIS — E78.2 MIXED HYPERLIPIDEMIA: ICD-10-CM

## 2024-07-15 PROBLEM — E78.5 HYPERLIPIDEMIA: Status: ACTIVE | Noted: 2024-07-15

## 2024-07-16 ENCOUNTER — PHARMACY VISIT (OUTPATIENT)
Dept: PHARMACY | Facility: CLINIC | Age: 49
End: 2024-07-16
Payer: COMMERCIAL

## 2024-07-16 ENCOUNTER — APPOINTMENT (OUTPATIENT)
Dept: ENDOCRINOLOGY | Facility: CLINIC | Age: 49
End: 2024-07-16
Payer: COMMERCIAL

## 2024-07-16 VITALS — WEIGHT: 169 LBS | BODY MASS INDEX: 28.12 KG/M2

## 2024-07-16 DIAGNOSIS — E66.3 OVERWEIGHT: ICD-10-CM

## 2024-07-16 DIAGNOSIS — Z76.89 ENCOUNTER FOR WEIGHT MANAGEMENT: ICD-10-CM

## 2024-07-16 PROCEDURE — 3008F BODY MASS INDEX DOCD: CPT | Performed by: INTERNAL MEDICINE

## 2024-07-16 PROCEDURE — 99215 OFFICE O/P EST HI 40 MIN: CPT | Performed by: INTERNAL MEDICINE

## 2024-07-16 ASSESSMENT — PAIN SCALES - GENERAL: PAINLEVEL: 0-NO PAIN

## 2024-07-16 NOTE — PROGRESS NOTES
Subjective   Niya Guerrero is a 48 y.o. female with a hx of T2DM, HLD, GERD, eczema, s/p knee surgeries bilaterally, MELANI who presents for weight management and obesity.    Virtual:  1. Nutrition: eat more veggies (cauliflower), drinks more smoothies    2. Sleep: sleep is fine     3. Stress: stress is better     4. Exercise: more active out doors     5. Appetite control: stable  AOM currently taking: Mounjaro 12.5    6. Goal: does yard work      Current Outpatient Medications:     atorvastatin (Lipitor) 20 mg tablet, TAKE 1 TABLET BY MOUTH DAILY AS DIRECTED, Disp: 90 tablet, Rfl: 3    cyclobenzaprine (Flexeril) 5 mg tablet, Take by mouth., Disp: , Rfl:     ergocalciferol (Vitamin D-2) 1.25 MG (81999 UT) capsule, Take by mouth., Disp: , Rfl:     meloxicam (Mobic) 15 mg tablet, Take by mouth., Disp: , Rfl:     metFORMIN XR (Glucophage-XR) 500 mg 24 hr tablet, TAKE 3 TABLETS BY MOUTH DAILY AS DIRECTED, Disp: 270 tablet, Rfl: 3    multivitamin tablet, Take 1 tablet by mouth once daily., Disp: , Rfl:     POTASSIUM CHLORIDE ORAL, Take 1 tablet by mouth once daily., Disp: , Rfl:     rivaroxaban (Xarelto) 20 mg tablet, Take by mouth., Disp: , Rfl:     semaglutide (Ozempic) 1 mg/dose (2 mg/1.5 mL) pen injector, Inject under the skin., Disp: , Rfl:     sodium hyaluronate (Euflexxa) 10 mg/mL(mw 2.4 -3.6 million) injection, Inject into the joint., Disp: , Rfl:     tirzepatide (Mounjaro) 12.5 mg/0.5 mL pen injector, Inject 12.5 mg under the skin every 7 days., Disp: 6 mL, Rfl: 2    ROS:  System: normal  Eyes : no visual changes  Neck : no tenderness, no new lumps/bumps  Respiratory : no SOB  Cardiovascular : no chest pain, no palpitations  Gastro-Intestinal : no abdominal concerns  Neurological : no numbness or tingling in the extremities  Musculoskeletal : no joint paint, no muscle pain  Skin : no unusual rashes  Psychiatric : no depression, no anxiety  See HPI for Endocrine ROS    Past Medical History:   Diagnosis Date     Obstructive sleep apnea (adult) (pediatric) 02/23/2017    Obstructive sleep apnea    Personal history of diseases of the skin and subcutaneous tissue 02/04/2015    History of urticaria    Personal history of other diseases of the musculoskeletal system and connective tissue 02/04/2015    History of arthritis    Personal history of other endocrine, nutritional and metabolic disease 01/22/2015    History of hypokalemia    Sprain of medial collateral ligament of unspecified knee, initial encounter 04/22/2014    Tear of MCL (medial collateral ligament) of knee    Unspecified tear of unspecified meniscus, current injury, unspecified knee, initial encounter 04/22/2014    Meniscus tear       Past Surgical History:   Procedure Laterality Date    HYSTEROSCOPY  01/22/2015    Hysteroscopy With Endometrial Ablation    KNEE ARTHROSCOPY W/ DEBRIDEMENT  04/22/2014    Knee Arthroscopy (Therapeutic)       Social History     Socioeconomic History    Marital status:      Spouse name: Not on file    Number of children: Not on file    Years of education: Not on file    Highest education level: Not on file   Occupational History    Not on file   Tobacco Use    Smoking status: Never    Smokeless tobacco: Never   Vaping Use    Vaping status: Never Used   Substance and Sexual Activity    Alcohol use: Not Currently    Drug use: Never    Sexual activity: Not on file   Other Topics Concern    Not on file   Social History Narrative    Not on file     Social Determinants of Health     Financial Resource Strain: Not on file   Food Insecurity: Not on file   Transportation Needs: Not on file   Physical Activity: Not on file   Stress: Not on file   Social Connections: Not on file   Intimate Partner Violence: Not on file   Housing Stability: Not on file       Objective    Physical Exam:  There were no vitals taken for this visit.  General : alert and oriented X3, no acute distress  Eyes : EOMI     Assessment/Plan   Niya Guerrero is a 48 y.o.  female with a hx of T2DM, HLD, GERD, eczema, s/p knee surgeries bilaterally, MELANI who presents for weight management and obesity.    1. Weight Management : Reviewed the principles of energy metabolism, caloric intake and expenditure, and rationale for a treatment program.  Also reinforced need for reduced calorie, low fat diet and increased physical activity.    We reviewed the possibility of starting an interdisciplinary lifestyle intervention program involving improvement of the diet, a personalized exercise program, efforts to reduce the stress and the possibility of using appetite suppressant medications in an effort to help with the weight loss process.  The patient expressed interest in the plan.    2. Nutrition : I discussed trying one of the diet approaches we have here in the program : Mediterranean lifestyle, ketosis diet.    3/29/23 : 199lb, 33.16kg/m2  5/9/23 : 193lb, 32.2kg/m2  6/14/23 : 192lb, 31.97kg/m2  7/26/23: 182.8lb, 30.37kg/m2  9/27/23: 176.6lb, 29.35kg/m2  1/9/24: 171.8lb, 28.59kg/m2  4/10/24: 169lb, 28.12kg/m2  5/8/24: 169lb, 28.12kg/m2  6/11/24: 170lb, 28.29kg/m2  7/16/24: 169.0 lb, 76.7 kg/m2    3. Sleep : is fine.     4. Stress : works in [employer], managed   is , has two daughters, one son.  Stress is better.     5. Exercise : has been doing work outside on her patio.     6. Appetite : stable.     7. DM : Managed by Dr. Shipley.  switched from ozempic 2mg/wk to Mounjaro now on 12.5mg/wk     8. Goal : to stay more active.    Follow up in a group visit.  Philip Peres MD

## 2024-07-26 ENCOUNTER — APPOINTMENT (OUTPATIENT)
Dept: CARDIOLOGY | Facility: CLINIC | Age: 49
End: 2024-07-26
Payer: COMMERCIAL

## 2024-07-26 DIAGNOSIS — E78.5 HYPERLIPIDEMIA, UNSPECIFIED HYPERLIPIDEMIA TYPE: ICD-10-CM

## 2024-07-26 DIAGNOSIS — E11.9 DIABETES MELLITUS TYPE II, NON INSULIN DEPENDENT (MULTI): ICD-10-CM

## 2024-08-08 PROCEDURE — RXMED WILLOW AMBULATORY MEDICATION CHARGE

## 2024-08-09 ENCOUNTER — PHARMACY VISIT (OUTPATIENT)
Dept: PHARMACY | Facility: CLINIC | Age: 49
End: 2024-08-09
Payer: COMMERCIAL

## 2024-08-19 NOTE — PROGRESS NOTES
Subjective  Niya Guerrero is a 48 y.o. female with a hx of T2DM, HLD, GERD, eczema, s/p knee surgeries bilaterally, MELANI who presents for weight management and obesity.     Virtual:  1. Nutrition: eat more veggies (cauliflower), drinks more smoothies     2. Sleep: sleep is fine      3. Stress: stress is better      4. Exercise: more active out doors- trying to go outside daily      5. Appetite control: stable  AOM currently taking: Mounjaro 12.5mg weekly      6. Goal: include different foods into diet.          Current Outpatient Medications:     atorvastatin (Lipitor) 20 mg tablet, TAKE 1 TABLET BY MOUTH DAILY AS DIRECTED, Disp: 90 tablet, Rfl: 3    cyclobenzaprine (Flexeril) 5 mg tablet, Take by mouth., Disp: , Rfl:     ergocalciferol (Vitamin D-2) 1.25 MG (16510 UT) capsule, Take by mouth., Disp: , Rfl:     meloxicam (Mobic) 15 mg tablet, Take by mouth., Disp: , Rfl:     metFORMIN XR (Glucophage-XR) 500 mg 24 hr tablet, TAKE 3 TABLETS BY MOUTH DAILY AS DIRECTED, Disp: 270 tablet, Rfl: 3    multivitamin tablet, Take 1 tablet by mouth once daily., Disp: , Rfl:     POTASSIUM CHLORIDE ORAL, Take 1 tablet by mouth once daily., Disp: , Rfl:     rivaroxaban (Xarelto) 20 mg tablet, Take by mouth., Disp: , Rfl:     semaglutide (Ozempic) 1 mg/dose (2 mg/1.5 mL) pen injector, Inject under the skin., Disp: , Rfl:     sodium hyaluronate (Euflexxa) 10 mg/mL(mw 2.4 -3.6 million) injection, Inject into the joint., Disp: , Rfl:     tirzepatide (Mounjaro) 12.5 mg/0.5 mL pen injector, Inject 12.5 mg under the skin every 7 days., Disp: 6 mL, Rfl: 2    ROS:  System: normal  Eyes : no visual changes  Neck : no tenderness, no new lumps/bumps  Respiratory : no SOB  Cardiovascular : no chest pain, no palpitations  Gastro-Intestinal : no abdominal concerns  Neurological : no numbness or tingling in the extremities  Musculoskeletal : no joint paint, no muscle pain  Skin : no unusual rashes  Psychiatric : no depression, no anxiety  See HPI  for Endocrine ROS    Past Medical History:   Diagnosis Date    Obstructive sleep apnea (adult) (pediatric) 02/23/2017    Obstructive sleep apnea    Personal history of diseases of the skin and subcutaneous tissue 02/04/2015    History of urticaria    Personal history of other diseases of the musculoskeletal system and connective tissue 02/04/2015    History of arthritis    Personal history of other endocrine, nutritional and metabolic disease 01/22/2015    History of hypokalemia    Sprain of medial collateral ligament of unspecified knee, initial encounter 04/22/2014    Tear of MCL (medial collateral ligament) of knee    Unspecified tear of unspecified meniscus, current injury, unspecified knee, initial encounter 04/22/2014    Meniscus tear       Past Surgical History:   Procedure Laterality Date    HYSTEROSCOPY  01/22/2015    Hysteroscopy With Endometrial Ablation    KNEE ARTHROSCOPY W/ DEBRIDEMENT  04/22/2014    Knee Arthroscopy (Therapeutic)       Social History     Socioeconomic History    Marital status:      Spouse name: Not on file    Number of children: Not on file    Years of education: Not on file    Highest education level: Not on file   Occupational History    Not on file   Tobacco Use    Smoking status: Never    Smokeless tobacco: Never   Vaping Use    Vaping status: Never Used   Substance and Sexual Activity    Alcohol use: Not Currently    Drug use: Never    Sexual activity: Not on file   Other Topics Concern    Not on file   Social History Narrative    Not on file     Social Determinants of Health     Financial Resource Strain: Not on file   Food Insecurity: Not on file   Transportation Needs: Not on file   Physical Activity: Not on file   Stress: Not on file   Social Connections: Not on file   Intimate Partner Violence: Not on file   Housing Stability: Not on file       Objective    Physical Exam:  There were no vitals taken for this visit.  General : alert and oriented X3, no acute  distress  Eyes : EOMI     Assessment/Plan  Niya Guerrero is a 48 y.o. female with a hx of T2DM, HLD, GERD, eczema, s/p knee surgeries bilaterally, MELANI who presents for weight management and obesity.     1. Weight Management : Reviewed the principles of energy metabolism, caloric intake and expenditure, and rationale for a treatment program.  Also reinforced need for reduced calorie, low fat diet and increased physical activity.     We reviewed the possibility of starting an interdisciplinary lifestyle intervention program involving improvement of the diet, a personalized exercise program, efforts to reduce the stress and the possibility of using appetite suppressant medications in an effort to help with the weight loss process.  The patient expressed interest in the plan.     2. Nutrition : I discussed trying one of the diet approaches we have here in the program : Mediterranean lifestyle, ketosis diet.     3/29/23 : 199lb, 33.16kg/m2  5/9/23 : 193lb, 32.2kg/m2  6/14/23 : 192lb, 31.97kg/m2  7/26/23: 182.8lb, 30.37kg/m2  9/27/23: 176.6lb, 29.35kg/m2  1/9/24: 171.8lb, 28.59kg/m2  4/10/24: 169lb, 28.12kg/m2  5/8/24: 169lb, 28.12kg/m2  6/11/24: 170lb, 28.29kg/m2  7/16/24: 169.0 lb, 76.7 kg/m2   8/20/24: 171lb,  30.29kg/m2     3. Sleep : is fine.     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  Stress is better.     5. Exercise : has been walking almost every night outside.     6. Appetite : stable.     7. DM : Managed by Dr. Shipley.  switched from ozempic 2mg/wk to Mounjaro now on 12.5mg/wk     8. Goal : to be less picky with her veggies - to try new ones.     Follow up in a group visit.  Philip Peres MD

## 2024-08-20 ENCOUNTER — APPOINTMENT (OUTPATIENT)
Dept: ENDOCRINOLOGY | Facility: CLINIC | Age: 49
End: 2024-08-20
Payer: COMMERCIAL

## 2024-08-20 VITALS — BODY MASS INDEX: 30.3 KG/M2 | WEIGHT: 171 LBS | HEIGHT: 63 IN

## 2024-08-20 DIAGNOSIS — E66.3 OVERWEIGHT: ICD-10-CM

## 2024-08-20 DIAGNOSIS — Z76.89 ENCOUNTER FOR WEIGHT MANAGEMENT: Primary | ICD-10-CM

## 2024-08-20 PROCEDURE — 3008F BODY MASS INDEX DOCD: CPT | Performed by: INTERNAL MEDICINE

## 2024-08-20 PROCEDURE — 99215 OFFICE O/P EST HI 40 MIN: CPT | Performed by: INTERNAL MEDICINE

## 2024-08-29 ENCOUNTER — APPOINTMENT (OUTPATIENT)
Dept: OPHTHALMOLOGY | Facility: CLINIC | Age: 49
End: 2024-08-29
Payer: COMMERCIAL

## 2024-08-29 DIAGNOSIS — H52.13 MYOPIA WITH PRESBYOPIA OF BOTH EYES: Primary | ICD-10-CM

## 2024-08-29 DIAGNOSIS — H52.4 MYOPIA WITH PRESBYOPIA OF BOTH EYES: Primary | ICD-10-CM

## 2024-08-29 DIAGNOSIS — D31.02 NEVUS OF LEFT CONJUNCTIVA: ICD-10-CM

## 2024-08-29 DIAGNOSIS — E11.9 TYPE 2 DIABETES MELLITUS WITHOUT COMPLICATION, WITHOUT LONG-TERM CURRENT USE OF INSULIN (MULTI): ICD-10-CM

## 2024-08-29 PROCEDURE — 92014 COMPRE OPH EXAM EST PT 1/>: CPT | Performed by: OPTOMETRIST

## 2024-08-29 PROCEDURE — 92015 DETERMINE REFRACTIVE STATE: CPT | Performed by: OPTOMETRIST

## 2024-08-29 ASSESSMENT — ENCOUNTER SYMPTOMS
CONSTITUTIONAL NEGATIVE: 0
CARDIOVASCULAR NEGATIVE: 0
ALLERGIC/IMMUNOLOGIC NEGATIVE: 0
EYES NEGATIVE: 1
HEMATOLOGIC/LYMPHATIC NEGATIVE: 0
PSYCHIATRIC NEGATIVE: 0
NEUROLOGICAL NEGATIVE: 0
ENDOCRINE NEGATIVE: 0
MUSCULOSKELETAL NEGATIVE: 0
GASTROINTESTINAL NEGATIVE: 0
RESPIRATORY NEGATIVE: 0

## 2024-08-29 ASSESSMENT — REFRACTION_MANIFEST
OD_ADD: +1.75
OD_CYLINDER: -0.25
OS_SPHERE: PLANO
OS_ADD: +1.75
OS_CYLINDER: SPHERE
OD_AXIS: 100
OD_SPHERE: -0.50

## 2024-08-29 ASSESSMENT — SLIT LAMP EXAM - LIDS
COMMENTS: GOOD POSITION
COMMENTS: GOOD POSITION

## 2024-08-29 ASSESSMENT — CONF VISUAL FIELD
OS_INFERIOR_NASAL_RESTRICTION: 0
OD_SUPERIOR_NASAL_RESTRICTION: 0
OS_SUPERIOR_NASAL_RESTRICTION: 0
OS_SUPERIOR_TEMPORAL_RESTRICTION: 0
OD_INFERIOR_NASAL_RESTRICTION: 0
OD_SUPERIOR_TEMPORAL_RESTRICTION: 0
OS_INFERIOR_TEMPORAL_RESTRICTION: 0
OD_NORMAL: 1
OD_INFERIOR_TEMPORAL_RESTRICTION: 0
OS_NORMAL: 1

## 2024-08-29 ASSESSMENT — REFRACTION_WEARINGRX
OD_AXIS: 100
OS_ADD: +1.50
OD_ADD: +1.50
OD_CYLINDER: -0.50
OS_CYLINDER: SPHER
OS_SPHERE: -0.25
OD_SPHERE: +0.25

## 2024-08-29 ASSESSMENT — CUP TO DISC RATIO
OD_RATIO: .25
OS_RATIO: .25

## 2024-08-29 ASSESSMENT — TONOMETRY
OD_IOP_MMHG: 16
IOP_METHOD: GOLDMANN APPLANATION
OS_IOP_MMHG: 16

## 2024-08-29 ASSESSMENT — EXTERNAL EXAM - LEFT EYE: OS_EXAM: NORMAL

## 2024-08-29 ASSESSMENT — VISUAL ACUITY
OS_SC: 20/20
OD_SC: 20/20
METHOD: SNELLEN - LINEAR

## 2024-08-29 ASSESSMENT — EXTERNAL EXAM - RIGHT EYE: OD_EXAM: NORMAL

## 2024-08-29 NOTE — PROGRESS NOTES
DM2 no retinopathy. I discussed the value of good blood sugar control under your management and annual eye exams.   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 Stable.   A spectacle prescription was dispensed to be used as needed.   The patient was asked to return to our clinic in one year or sooner if ocular or vision changes occur.

## 2024-08-30 PROCEDURE — RXMED WILLOW AMBULATORY MEDICATION CHARGE

## 2024-09-02 PROBLEM — E78.5 HYPERLIPIDEMIA: Status: RESOLVED | Noted: 2024-07-15 | Resolved: 2024-09-02

## 2024-09-03 ENCOUNTER — APPOINTMENT (OUTPATIENT)
Dept: ALLERGY | Facility: CLINIC | Age: 49
End: 2024-09-03
Payer: COMMERCIAL

## 2024-09-03 VITALS — WEIGHT: 174 LBS | HEART RATE: 78 BPM | BODY MASS INDEX: 28.99 KG/M2 | HEIGHT: 65 IN

## 2024-09-03 DIAGNOSIS — Z91.013 SEAFOOD ALLERGY: ICD-10-CM

## 2024-09-03 DIAGNOSIS — T36.0X5A ADVERSE EFFECT OF PENICILLIN, INITIAL ENCOUNTER: ICD-10-CM

## 2024-09-03 DIAGNOSIS — J30.9 ALLERGIC RHINITIS, UNSPECIFIED SEASONALITY, UNSPECIFIED TRIGGER: Primary | ICD-10-CM

## 2024-09-03 PROCEDURE — RXMED WILLOW AMBULATORY MEDICATION CHARGE

## 2024-09-03 PROCEDURE — 99205 OFFICE O/P NEW HI 60 MIN: CPT | Performed by: ALLERGY & IMMUNOLOGY

## 2024-09-03 PROCEDURE — 95018 ALL TSTG PERQ&IQ DRUGS/BIOL: CPT | Performed by: ALLERGY & IMMUNOLOGY

## 2024-09-03 PROCEDURE — 95024 IQ TESTS W/ALLERGENIC XTRCS: CPT | Performed by: ALLERGY & IMMUNOLOGY

## 2024-09-03 PROCEDURE — 95004 PERQ TESTS W/ALRGNC XTRCS: CPT | Performed by: ALLERGY & IMMUNOLOGY

## 2024-09-03 RX ORDER — CETIRIZINE HYDROCHLORIDE 10 MG/1
10 TABLET ORAL DAILY
COMMUNITY

## 2024-09-03 RX ORDER — MONTELUKAST SODIUM 10 MG/1
10 TABLET ORAL DAILY
Qty: 90 TABLET | Refills: 3 | Status: SHIPPED | OUTPATIENT
Start: 2024-09-03 | End: 2025-09-03

## 2024-09-03 RX ORDER — CHOLECALCIFEROL (VITAMIN D3) 125 MCG
220 CAPSULE ORAL EVERY 12 HOURS
COMMUNITY

## 2024-09-03 NOTE — PROGRESS NOTES
"  Subjective   Patient ID:   [id number]   Niya Guerrero is a 48 y.o. female who presents for Allergy Testing.    Chief Complaint   Patient presents with    Allergy Testing      This is a new patient, with a H/O osteoarthritis, T2DM, presenting for allergy symptoms.    Patient states she has had longstanding allergy Sx and recently developed a hive on her forearm after hugging her [place] Hound.  She has had the dog 13 years and the dog sleeps in her daughter's room.  Patient wants to be able to play with the dog more.  She also reacts to pollens.  She will experience nasal congestion, itchy eyes, runny nose and bumps around her neck.  She sometimes has eczema and will use eczema cream to help with the irritation.      Patient will take a Zyrtec and most times, still may have to take a Benadryl to try to have some relief.  She will still have hives after touching her dog even with medications.  She has tried nasal sprays in the past with no relief.  She has never tried Singulair or azelastine.     Patient was at her sister's home at one time when they were cooking lobster.  She notes simply from the odor, she developed difficulty breathing.  She was tested and was positive to lobster but not shrimp.  She does alert the  when she goes out to eat but does not carry an EpiPen..    About 15-20 years ago, patient developed hives after having penicillin around the beginning to middle of the course.  She denies any associated joint pain at that time she can recall.    Patient works at  [employer]; she travels around to multiple offices.  She will be going to [place] and returning 9-21-24.    Review of Systems   Skin:  Positive for rash (intermittent eczema).   Allergic/Immunologic: Positive for environmental allergies and food allergies.     Objective   Pulse 78   Ht 1.651 m (5' 5\")   Wt 78.9 kg (174 lb)   BMI 28.96 kg/m²      Physical Exam  Constitutional:       Appearance: Normal appearance.   HENT: "      Head: Normocephalic and atraumatic.      Right Ear: External ear normal. There is no impacted cerumen.      Left Ear: External ear normal. There is no impacted cerumen.      Nose: No congestion or rhinorrhea.   Eyes:      Extraocular Movements: Extraocular movements intact.      Conjunctiva/sclera: Conjunctivae normal.      Pupils: Pupils are equal, round, and reactive to light.   Cardiovascular:      Rate and Rhythm: Normal rate and regular rhythm.      Heart sounds: No murmur heard.     No friction rub. No gallop.   Pulmonary:      Effort: No respiratory distress.      Breath sounds: No wheezing, rhonchi or rales.   Skin:     General: Skin is warm and dry.   Neurological:      Mental Status: She is alert.   Psychiatric:         Mood and Affect: Mood normal.         Behavior: Behavior normal.     Allergy testing was performed on Niya Guerrero using standard technique. There were no immediate complications.    Test Results  Panel 1  Shrimp: 0  Fish Shellfish Panel  Clam: 0  Crab: 0  Lobster: 0  Oyster: 0  Scallop: 0    Skin testing to shellfish is negative.    Allergy testing was performed on Niya Guerrero using standard technique. There were no immediate complications.    Test Results  Controls  Positive Histamine: 5 x 5  Negative Saline: 0  Panel 1  Cat: 0  Cockroach: 0  Cotton: 0  Dog: 3  D. Farinae: 3  D. Pter: 3  Feather: 0  Phoma: 0  Tree Panel  Fitz: 0  Beech: 0  Birch: 0  Walnut: 0  Newman: 0  Maple: 0  Oak: 0  Miami: 0 (Elm 0)  Sycamore: 0  Grass/Misc Tree  Bermuda: 0  Black [place]: 0  Sarabjit: 0  [place] Blue: 0  [place] Fescue: 0 (Rory 0)  Orchard: 0  Red Top: 0  Rye Grass: 0  Sweet Vernal: 0  Ot[place]: 0  Weeds  Cocklebur: 0  Common Mugwort: 5  English Plantain: 0  Hemp: 0  Kochia: 0  Lamb's Quarter: 0  Marsh Elder: 0  Pigweed: 0  Nettle: 3 (Goldenrod 0)  Ragweed: 0  Molds  Alternaria: 0  Aspergillus: 0  Aureobasid: 0  Bipolaris: 0  Cladosporidium: 0  Epicoccum: 0  Fusarium: 0  Geotrichum:  0  Helminthosporium: 0  Penicillium: 0    Intradermal allergy testing was performed on Niya Guerrero using standard technique. There were no immediate complications.    Test Results  Controls  Intradermal Saline: 0  ID  Cat: 0  Cockroach: 3  Cotton: 0  Feather: 0  KORT Mix: 0  Mold Mix #1: 0  Mold Mix #2: 0  Ragweed: 4  Tree Mix: 3    Skin testing is positive to dog, dust mite, weeds, ragweed,  cockroach and trees.    Drug/Vaccine allergy testing was performed on Niya Guerrero using standard technique. There were no immediate complications.    Test Results  Controls  Intradermal Saline: 0  ANTIGEN 1  Drug/Vaccine Name: Penicillin  Concentration/Solution: 10,000 U  Result: Negative    Skin testing to penicillin is negative.    Assessment/Plan     Allergic rhinitis  She has longstanding allergy Sx and experienced a hive on her forearm after hugging her [place] Hound.  She also reacts to pollens with minimal relief from Zyrtec and Benadryl.  She C/O nasal congestion, itchy eyes, runny nose and bumps around her neck.    Skin testing is positive to dog, weeds, dust mite, ragweed, cockroach and trees.    She will start shots and we will call to set up an appointment when vials are completed.    Continue Zyrtec PRN for itching, runny nose and sneezing.  For increased symptoms or hive flare, you may increase to twice a day.    Start Singulair at bedtime to help decrease sinus and lung inflammation.  Side effects reported vivid dreams,  mood changes as well as ear popping.  If you experience ear popping, this indicates the medication is working so please continue to take it.        Shellfish allergy  Patient developed respiratory difficulty after just the odor of cooking lobster while at her sister's home.  She tested positive in the past to lobster but not to shrimp.    Skin testing to shellfish is negative.    She will have Immunocap to shellfish.    It is necessary for her to avoid it in the interim    Penicillin adverse  reaction  Several years ago, she had hives after having penicillin around the beginning to middle of the course, but no associated joint pain she recalls.    Skin testing to penicillin is negative.  She needs to return for oral challenge to amoxicillin    By signing my name below, I, Adriana Bolton, attest that this documentation has been prepared under the direction and in the presence of Marisol Le MD.  All medical record entries made by the Adolfoibe were at my direction and personally dictated by me. I have reviewed the chart and agree that the record accurately reflects my personal performance of the history, physical exam, discussion and plan.

## 2024-09-03 NOTE — ASSESSMENT & PLAN NOTE
Patient developed respiratory difficulty after just the odor of cooking lobster while at her sister's home.  She tested positive in the past to lobster but not to shrimp.    Skin testing to shellfish is negative.    She will have Immunocap to shellfish.    It is necessary for her to avoid it in the interim

## 2024-09-03 NOTE — PATIENT INSTRUCTIONS
Skin testing is positive to dog, dust mite, weeds, ragweed, cockroach and trees.    Skin testing to lobster is negative.  Complete blood work to evaluate seafood allergy.  Avoid shellfish until results return.  I will follow up with you with results and further recommendations.     Skin testing to penicillin is negative.  Return for oral challenge.  Be sure to avoid all antihistamines 5 days before testing and nothing to eat or drink 3 hours prior to testing, other than water or black coffee with no cream.  Bring in the amoxicillin with you.  Testing will take approximately 2-3 hours.        Continue Zyrtec as needed for itching, runny nose and sneezing.  For increased symptoms or hive flare, you may increase to twice a day.    Start Singulair at bedtime to help decrease sinus and lung inflammation.  Side effects reported vivid dreams,  mood changes as well as ear popping.  If you experience ear popping, this indicates the medication is working so please continue to take it.      Start allergy immunotherapy.  (CPT codes for mix vials 58262.  Allergy injections  29694 or 38415).  We will prepare vial mix and call you to schedule your first shot.

## 2024-09-03 NOTE — ASSESSMENT & PLAN NOTE
Several years ago, she had hives after having penicillin around the beginning to middle of the course, but no associated joint pain she recalls.    Skin testing to penicillin is negative.  She needs to return for oral challenge to amoxicillin

## 2024-09-03 NOTE — ASSESSMENT & PLAN NOTE
She has longstanding allergy Sx and experienced a hive on her forearm after hugging her [place] Hound.  She also reacts to pollens with minimal relief from Zyrtec and Benadryl.  She C/O nasal congestion, itchy eyes, runny nose and bumps around her neck.    Skin testing is positive to dog, weeds, dust mite, ragweed, cockroach and trees.    She will start shots and we will call to set up an appointment when vials are completed.    Continue Zyrtec PRN for itching, runny nose and sneezing.  For increased symptoms or hive flare, you may increase to twice a day.    Start Singulair at bedtime to help decrease sinus and lung inflammation.  Side effects reported vivid dreams,  mood changes as well as ear popping.  If you experience ear popping, this indicates the medication is working so please continue to take it.      She will start allergy immunotherapy due to the severity of her symptoms

## 2024-09-04 ENCOUNTER — PHARMACY VISIT (OUTPATIENT)
Dept: PHARMACY | Facility: CLINIC | Age: 49
End: 2024-09-04
Payer: COMMERCIAL

## 2024-09-05 ENCOUNTER — LAB (OUTPATIENT)
Dept: LAB | Facility: LAB | Age: 49
End: 2024-09-05
Payer: COMMERCIAL

## 2024-09-05 DIAGNOSIS — E78.2 MIXED HYPERLIPIDEMIA: ICD-10-CM

## 2024-09-05 DIAGNOSIS — Z91.013 SEAFOOD ALLERGY: ICD-10-CM

## 2024-09-05 DIAGNOSIS — E11.9 DIABETES MELLITUS TYPE II, NON INSULIN DEPENDENT (MULTI): ICD-10-CM

## 2024-09-05 LAB
ALBUMIN SERPL BCP-MCNC: 4 G/DL (ref 3.4–5)
ALP SERPL-CCNC: 70 U/L (ref 33–110)
ALT SERPL W P-5'-P-CCNC: 17 U/L (ref 7–45)
ANION GAP SERPL CALC-SCNC: 10 MMOL/L (ref 10–20)
AST SERPL W P-5'-P-CCNC: 15 U/L (ref 9–39)
BASOPHILS # BLD AUTO: 0.04 X10*3/UL (ref 0–0.1)
BASOPHILS NFR BLD AUTO: 0.8 %
BILIRUB SERPL-MCNC: 0.5 MG/DL (ref 0–1.2)
BUN SERPL-MCNC: 18 MG/DL (ref 6–23)
CALCIUM SERPL-MCNC: 9.3 MG/DL (ref 8.6–10.6)
CHLORIDE SERPL-SCNC: 107 MMOL/L (ref 98–107)
CHOLEST SERPL-MCNC: 178 MG/DL (ref 0–199)
CHOLESTEROL/HDL RATIO: 4.3
CO2 SERPL-SCNC: 27 MMOL/L (ref 21–32)
CREAT SERPL-MCNC: 0.75 MG/DL (ref 0.5–1.05)
EGFRCR SERPLBLD CKD-EPI 2021: >90 ML/MIN/1.73M*2
EOSINOPHIL # BLD AUTO: 0.07 X10*3/UL (ref 0–0.7)
EOSINOPHIL NFR BLD AUTO: 1.4 %
ERYTHROCYTE [DISTWIDTH] IN BLOOD BY AUTOMATED COUNT: 11.8 % (ref 11.5–14.5)
EST. AVERAGE GLUCOSE BLD GHB EST-MCNC: 88 MG/DL
GLUCOSE SERPL-MCNC: 72 MG/DL (ref 74–99)
HBA1C MFR BLD: 4.7 %
HCT VFR BLD AUTO: 40.2 % (ref 36–46)
HDLC SERPL-MCNC: 41.3 MG/DL
HGB BLD-MCNC: 13.1 G/DL (ref 12–16)
IMM GRANULOCYTES # BLD AUTO: 0.01 X10*3/UL (ref 0–0.7)
IMM GRANULOCYTES NFR BLD AUTO: 0.2 % (ref 0–0.9)
LDLC SERPL CALC-MCNC: 114 MG/DL
LYMPHOCYTES # BLD AUTO: 1.8 X10*3/UL (ref 1.2–4.8)
LYMPHOCYTES NFR BLD AUTO: 36.9 %
MCH RBC QN AUTO: 29.8 PG (ref 26–34)
MCHC RBC AUTO-ENTMCNC: 32.6 G/DL (ref 32–36)
MCV RBC AUTO: 92 FL (ref 80–100)
MONOCYTES # BLD AUTO: 0.38 X10*3/UL (ref 0.1–1)
MONOCYTES NFR BLD AUTO: 7.8 %
NEUTROPHILS # BLD AUTO: 2.58 X10*3/UL (ref 1.2–7.7)
NEUTROPHILS NFR BLD AUTO: 52.9 %
NON HDL CHOLESTEROL: 137 MG/DL (ref 0–149)
NRBC BLD-RTO: 0 /100 WBCS (ref 0–0)
PLATELET # BLD AUTO: 378 X10*3/UL (ref 150–450)
POTASSIUM SERPL-SCNC: 4.2 MMOL/L (ref 3.5–5.3)
PROT SERPL-MCNC: 6.9 G/DL (ref 6.4–8.2)
RBC # BLD AUTO: 4.39 X10*6/UL (ref 4–5.2)
SODIUM SERPL-SCNC: 140 MMOL/L (ref 136–145)
TRIGL SERPL-MCNC: 116 MG/DL (ref 0–149)
VLDL: 23 MG/DL (ref 0–40)
WBC # BLD AUTO: 4.9 X10*3/UL (ref 4.4–11.3)

## 2024-09-05 PROCEDURE — 85025 COMPLETE CBC W/AUTO DIFF WBC: CPT

## 2024-09-05 PROCEDURE — 82785 ASSAY OF IGE: CPT

## 2024-09-05 PROCEDURE — 83036 HEMOGLOBIN GLYCOSYLATED A1C: CPT

## 2024-09-05 PROCEDURE — 86003 ALLG SPEC IGE CRUDE XTRC EA: CPT

## 2024-09-05 PROCEDURE — 80053 COMPREHEN METABOLIC PANEL: CPT

## 2024-09-05 PROCEDURE — 36415 COLL VENOUS BLD VENIPUNCTURE: CPT

## 2024-09-05 PROCEDURE — 80061 LIPID PANEL: CPT

## 2024-09-06 ENCOUNTER — PHARMACY VISIT (OUTPATIENT)
Dept: PHARMACY | Facility: CLINIC | Age: 49
End: 2024-09-06
Payer: COMMERCIAL

## 2024-09-06 LAB
CRAB IGE QN: <0.1 KU/L
LOBSTER IGE QN: <0.1 KU/L
SHRIMP IGE QN: 0.15 KU/L
TOTAL IGE SMQN RAST: 97.4 KU/L

## 2024-09-09 DIAGNOSIS — J30.1 HAY FEVER: ICD-10-CM

## 2024-09-09 DIAGNOSIS — J30.89 PERENNIAL ALLERGIC RHINITIS: ICD-10-CM

## 2024-09-09 DIAGNOSIS — J30.81 ANIMAL DANDER ALLERGY: Primary | ICD-10-CM

## 2024-09-09 PROCEDURE — 95165 ANTIGEN THERAPY SERVICES: CPT | Performed by: ALLERGY & IMMUNOLOGY

## 2024-09-11 ENCOUNTER — DOCUMENTATION (OUTPATIENT)
Dept: CARE COORDINATION | Facility: CLINIC | Age: 49
End: 2024-09-11

## 2024-09-11 ENCOUNTER — APPOINTMENT (OUTPATIENT)
Dept: ENDOCRINOLOGY | Facility: CLINIC | Age: 49
End: 2024-09-11
Payer: COMMERCIAL

## 2024-09-11 NOTE — PROGRESS NOTES
[facility] CCM encounter:  Niya was identified from the [employer] engagement report    Tamia Hess RN CCM  [facility]  [phone]

## 2024-09-13 ENCOUNTER — OFFICE VISIT (OUTPATIENT)
Dept: ORTHOPEDIC SURGERY | Facility: HOSPITAL | Age: 49
End: 2024-09-13
Payer: COMMERCIAL

## 2024-09-13 DIAGNOSIS — M17.0 BILATERAL PRIMARY OSTEOARTHRITIS OF KNEE: Primary | ICD-10-CM

## 2024-09-13 PROCEDURE — 1036F TOBACCO NON-USER: CPT | Performed by: PHYSICIAN ASSISTANT

## 2024-09-13 PROCEDURE — 2500000004 HC RX 250 GENERAL PHARMACY W/ HCPCS (ALT 636 FOR OP/ED): Performed by: PHYSICIAN ASSISTANT

## 2024-09-13 PROCEDURE — 3044F HG A1C LEVEL LT 7.0%: CPT | Performed by: PHYSICIAN ASSISTANT

## 2024-09-13 PROCEDURE — 3049F LDL-C 100-129 MG/DL: CPT | Performed by: PHYSICIAN ASSISTANT

## 2024-09-13 PROCEDURE — 20610 DRAIN/INJ JOINT/BURSA W/O US: CPT | Mod: 50 | Performed by: PHYSICIAN ASSISTANT

## 2024-09-13 RX ORDER — TRIAMCINOLONE ACETONIDE 40 MG/ML
40 INJECTION, SUSPENSION INTRA-ARTICULAR; INTRAMUSCULAR
Status: COMPLETED | OUTPATIENT
Start: 2024-09-13 | End: 2024-09-13

## 2024-09-13 RX ORDER — TRIAMCINOLONE ACETONIDE 40 MG/ML
2.5 INJECTION, SUSPENSION INTRA-ARTICULAR; INTRAMUSCULAR
Status: COMPLETED | OUTPATIENT
Start: 2024-09-13 | End: 2024-09-13

## 2024-09-13 ASSESSMENT — PAIN SCALES - GENERAL: PAINLEVEL_OUTOF10: 5 - MODERATE PAIN

## 2024-09-13 ASSESSMENT — PAIN - FUNCTIONAL ASSESSMENT: PAIN_FUNCTIONAL_ASSESSMENT: 0-10

## 2024-09-13 NOTE — PROGRESS NOTES
Subjective    Patient ID: Niya Guerrero is a 48 y.o. female.    Chief Complaint: Injections of the Left Knee and Injections of the Right Knee           HPI:  Niya Guerrero is a 48 y.o. female with known degenerative changes of both knees who presents today for repeat intra-articular steroid injections.  She is leaving to go to [place] tomorrow so she is hoping to have a little bit of improvement in her symptoms prior to her trip.    ROS  Constitutional: No fever, no chills, not feeling tired, no recent weight gain and no recent weight loss  ENT: No nosebleeds  Cardiovascular: No chest pain  Respiratory: No shortness of breath and no cough  Gastrointestinal: No abdominal pain, no nausea, no diarrhea, and no vomiting  Musculoskeletal: No arthralgias  Integumentary: No rashes and no skin lesions  Neurological: No headache  Psychiatric: No sleep disturbances no depression  Endocrine: No muscle weakness and no muscle cramps  Hematologic/lymphatic: No swelling glands and no tendency for easy bruising    Past Medical History:   Diagnosis Date    Obstructive sleep apnea (adult) (pediatric) 02/23/2017    Obstructive sleep apnea    Personal history of diseases of the skin and subcutaneous tissue 02/04/2015    History of urticaria    Personal history of other diseases of the musculoskeletal system and connective tissue 02/04/2015    History of arthritis    Personal history of other endocrine, nutritional and metabolic disease 01/22/2015    History of hypokalemia    Sprain of medial collateral ligament of unspecified knee, initial encounter 04/22/2014    Tear of MCL (medial collateral ligament) of knee    Unspecified tear of unspecified meniscus, current injury, unspecified knee, initial encounter 04/22/2014    Meniscus tear        Past Surgical History:   Procedure Laterality Date    HYSTEROSCOPY  01/22/2015    Hysteroscopy With Endometrial Ablation    KNEE ARTHROSCOPY W/ DEBRIDEMENT  04/22/2014    Knee Arthroscopy  (Therapeutic)          Current Outpatient Medications:     atorvastatin (Lipitor) 20 mg tablet, TAKE 1 TABLET BY MOUTH DAILY AS DIRECTED, Disp: 90 tablet, Rfl: 3    cetirizine (ZyrTEC) 10 mg tablet, Take 1 tablet (10 mg) by mouth once daily., Disp: , Rfl:     ergocalciferol (Vitamin D-2) 1.25 MG (46673 UT) capsule, Take by mouth., Disp: , Rfl:     montelukast (Singulair) 10 mg tablet, Take 1 tablet (10 mg) by mouth once daily., Disp: 90 tablet, Rfl: 3    multivitamin tablet, Take 1 tablet by mouth once daily., Disp: , Rfl:     naproxen sodium (Aleve) 220 mg capsule, Take 220 mg by mouth every 12 hours., Disp: , Rfl:     tirzepatide (Mounjaro) 12.5 mg/0.5 mL pen injector, Inject 12.5 mg under the skin every 7 days., Disp: 6 mL, Rfl: 2     Allergies   Allergen Reactions    Penicillin Hives        Social Connections: Not on file          Objective   48-year-old female well appearing in no acute distress. Alert and oriented ×3.  Skin intact bilateral lower extremities.   Normal tandem gait. Coordination and balance intact.  Bilateral lower extremity compartments supple.  2+ DP/PT pulses bilaterally.  Injection site both knees is clear    L Inj/Asp: bilateral knee on 9/13/2024 8:09 AM  Indications: pain  Details: 22 G needle, superolateral approach  Medications (Right): 2.5 mg triamcinolone acetonide 40 mg/mL  Medications (Left): 40 mg triamcinolone acetonide 40 mg/mL      Knee injection:    Right knee intra-articular injection is offered for therapeutic purposes.  Indication is knee pain.  Risks and benefits of the injection were discussed.  After questions were answered, consent was provided to proceed.  Under sterile conditions, the knee was injected through a superolateral patellar site with 40 mg Kenalog and 5 cc lidocaine without complication.  The patient tolerated the injection well.  A dressing was applied.  Post injection instructions were given.    Left knee intra-articular injection is offered for therapeutic  purposes.  Indication is knee pain.  Risks and benefits of the injection were discussed.  After questions were answered, consent was provided to proceed.  Under sterile conditions, the knee was injected through a superolateral patellar site with 40 mg Kenalog and 5 cc lidocaine without complication.  The patient tolerated the injection well.  A dressing was applied.  Post injection instructions were given.          Assessment/Plan   Encounter Diagnoses:  Bilateral primary osteoarthritis of knee    No orders of the defined types were placed in this encounter.      Hopefully today's injections will provide her some relief.  She may use over-the-counter medicines if needed.  She will call with any questions or concerns.    This office note was dictated using Dragon voice to text software and was not proofread for spelling or grammatical errors

## 2024-09-16 ENCOUNTER — PATIENT OUTREACH (OUTPATIENT)
Dept: CARE COORDINATION | Facility: CLINIC | Age: 49
End: 2024-09-16
Payer: COMMERCIAL

## 2024-09-16 NOTE — PROGRESS NOTES
[facility] outreach:  Spoke with Niya, she agrees to [facility] while in the [employer] program    Tamia Hess RN [facility]  [phone]

## 2024-09-27 ENCOUNTER — APPOINTMENT (OUTPATIENT)
Dept: ALLERGY | Facility: CLINIC | Age: 49
End: 2024-09-27
Payer: COMMERCIAL

## 2024-09-27 DIAGNOSIS — J30.2 SEASONAL ALLERGIES: ICD-10-CM

## 2024-09-27 PROCEDURE — 95117 IMMUNOTHERAPY INJECTIONS: CPT | Performed by: ALLERGY & IMMUNOLOGY

## 2024-10-01 NOTE — PROGRESS NOTES
Subjective  Niya Guerrero is a 48 y.o. female with a hx of T2DM, HLD, GERD, eczema, s/p knee surgeries bilaterally, MELANI who presents for weight management and obesity.    Virtual:  Current Plan  1. Nutrition: Mediterranean Diet     2. Sleep: Stable      3. Stress: Stable     4. Exercise:  walking more- Has a new 10 week old puppy        5. Appetite control: Stable  Obesity medication: Mounjaro- 12.5mg weekly      6. Prior Goals: Partially Met- tried food at [employer] restaurants with daughter in [place]     New Goals: Exercise- continue walking     Weight trend:    Wt Readings from Last 3 Encounters:   09/03/24 78.9 kg (174 lb)   08/20/24 77.6 kg (171 lb)   07/16/24 76.7 kg (169 lb)       Recent weight:      Current Outpatient Medications:     atorvastatin (Lipitor) 20 mg tablet, TAKE 1 TABLET BY MOUTH DAILY AS DIRECTED, Disp: 90 tablet, Rfl: 3    cetirizine (ZyrTEC) 10 mg tablet, Take 1 tablet (10 mg) by mouth once daily., Disp: , Rfl:     ergocalciferol (Vitamin D-2) 1.25 MG (04843 UT) capsule, Take by mouth., Disp: , Rfl:     montelukast (Singulair) 10 mg tablet, Take 1 tablet (10 mg) by mouth once daily., Disp: 90 tablet, Rfl: 3    multivitamin tablet, Take 1 tablet by mouth once daily., Disp: , Rfl:     naproxen sodium (Aleve) 220 mg capsule, Take 220 mg by mouth every 12 hours., Disp: , Rfl:     tirzepatide (Mounjaro) 12.5 mg/0.5 mL pen injector, Inject 12.5 mg under the skin every 7 days., Disp: 6 mL, Rfl: 2    ROS:  System: normal  Eyes : no visual changes  Neck : no tenderness, no new lumps/bumps  Respiratory : no SOB  Cardiovascular : no chest pain, no palpitations  Gastro-Intestinal : no abdominal concerns  Neurological : no numbness or tingling in the extremities  Musculoskeletal : no joint paint, no muscle pain  Skin : no unusual rashes  Psychiatric : no depression, no anxiety  See HPI for Endocrine ROS    Past Medical History:   Diagnosis Date    Obstructive sleep apnea (adult) (pediatric) 02/23/2017     Obstructive sleep apnea    Personal history of diseases of the skin and subcutaneous tissue 02/04/2015    History of urticaria    Personal history of other diseases of the musculoskeletal system and connective tissue 02/04/2015    History of arthritis    Personal history of other endocrine, nutritional and metabolic disease 01/22/2015    History of hypokalemia    Sprain of medial collateral ligament of unspecified knee, initial encounter 04/22/2014    Tear of MCL (medial collateral ligament) of knee    Unspecified tear of unspecified meniscus, current injury, unspecified knee, initial encounter 04/22/2014    Meniscus tear       Past Surgical History:   Procedure Laterality Date    HYSTEROSCOPY  01/22/2015    Hysteroscopy With Endometrial Ablation    KNEE ARTHROSCOPY W/ DEBRIDEMENT  04/22/2014    Knee Arthroscopy (Therapeutic)       Social History     Socioeconomic History    Marital status:      Spouse name: Not on file    Number of children: Not on file    Years of education: Not on file    Highest education level: Not on file   Occupational History    Not on file   Tobacco Use    Smoking status: Never    Smokeless tobacco: Never   Vaping Use    Vaping status: Never Used   Substance and Sexual Activity    Alcohol use: Not Currently    Drug use: Never    Sexual activity: Not on file   Other Topics Concern    Not on file   Social History Narrative    Not on file     Social Determinants of Health     Financial Resource Strain: Not on file   Food Insecurity: Not on file   Transportation Needs: Not on file   Physical Activity: Not on file   Stress: Not on file   Social Connections: Not on file   Intimate Partner Violence: Not on file   Housing Stability: Not on file       Objective      Physical Exam:  There were no vitals taken for this visit.  General : alert and oriented X3, no acute distress  Eyes : EOMI     Assessment/Plan  Niya Guerrero is a 48 y.o. female with a hx of T2DM, HLD, GERD, eczema, s/p knee  surgeries bilaterally, MELANI who presents for weight management and obesity.     1. Weight Management : Reviewed the principles of energy metabolism, caloric intake and expenditure, and rationale for a treatment program.  Also reinforced need for reduced calorie, low fat diet and increased physical activity.     We reviewed the possibility of starting an interdisciplinary lifestyle intervention program involving improvement of the diet, a personalized exercise program, efforts to reduce the stress and the possibility of using appetite suppressant medications in an effort to help with the weight loss process.  The patient expressed interest in the plan.     2. Nutrition : I discussed trying one of the diet approaches we have here in the program : Mediterranean lifestyle, ketosis diet.     3/29/23 : 199lb, 33.16kg/m2  5/9/23 : 193lb, 32.2kg/m2  6/14/23 : 192lb, 31.97kg/m2  7/26/23: 182.8lb, 30.37kg/m2  9/27/23: 176.6lb, 29.35kg/m2  1/9/24: 171.8lb, 28.59kg/m2  4/10/24: 169lb, 28.12kg/m2  5/8/24: 169lb, 28.12kg/m2  6/11/24: 170lb, 28.29kg/m2  7/16/24: 169.0 lb, 76.7 kg/m2   8/20/24: 171lb,  30.29kg/m2   10/02/24: 171lb, 28.46kg/m2     3. Sleep : is fine.     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  Stress is better.  Recently travelled      5. Exercise : has been walking almost every night outside.     6. Appetite : stable.     7. DM : Managed by Dr. Shipley.  switched from ozempic 2mg/wk to Mounjaro now on 12.5mg/wk     8. Goal : to continue to walk outside, and walk in the mall when getting colder.     Follow up in a group visit.  Philip Peres MD

## 2024-10-02 ENCOUNTER — APPOINTMENT (OUTPATIENT)
Dept: ENDOCRINOLOGY | Facility: CLINIC | Age: 49
End: 2024-10-02
Payer: COMMERCIAL

## 2024-10-02 VITALS — HEIGHT: 65 IN | BODY MASS INDEX: 28.49 KG/M2 | WEIGHT: 171 LBS

## 2024-10-02 DIAGNOSIS — E66.3 OVERWEIGHT: ICD-10-CM

## 2024-10-02 DIAGNOSIS — Z76.89 ENCOUNTER FOR WEIGHT MANAGEMENT: ICD-10-CM

## 2024-10-02 PROCEDURE — 3049F LDL-C 100-129 MG/DL: CPT | Performed by: INTERNAL MEDICINE

## 2024-10-02 PROCEDURE — 3008F BODY MASS INDEX DOCD: CPT | Performed by: INTERNAL MEDICINE

## 2024-10-02 PROCEDURE — 99215 OFFICE O/P EST HI 40 MIN: CPT | Performed by: INTERNAL MEDICINE

## 2024-10-02 PROCEDURE — 3044F HG A1C LEVEL LT 7.0%: CPT | Performed by: INTERNAL MEDICINE

## 2024-10-03 DIAGNOSIS — E11.9 DIABETES MELLITUS TYPE 2, NONINSULIN DEPENDENT (MULTI): ICD-10-CM

## 2024-10-04 ENCOUNTER — APPOINTMENT (OUTPATIENT)
Dept: ALLERGY | Facility: CLINIC | Age: 49
End: 2024-10-04
Payer: COMMERCIAL

## 2024-10-04 DIAGNOSIS — J30.2 SEASONAL ALLERGIES: ICD-10-CM

## 2024-10-04 PROCEDURE — 95117 IMMUNOTHERAPY INJECTIONS: CPT | Performed by: ALLERGY & IMMUNOLOGY

## 2024-10-07 ENCOUNTER — PHARMACY VISIT (OUTPATIENT)
Dept: PHARMACY | Facility: CLINIC | Age: 49
End: 2024-10-07
Payer: COMMERCIAL

## 2024-10-07 DIAGNOSIS — E11.9 DIABETES MELLITUS TYPE 2, NONINSULIN DEPENDENT (MULTI): ICD-10-CM

## 2024-10-07 PROCEDURE — RXMED WILLOW AMBULATORY MEDICATION CHARGE

## 2024-10-07 RX ORDER — TIRZEPATIDE 12.5 MG/.5ML
12.5 INJECTION, SOLUTION SUBCUTANEOUS
Qty: 6 ML | Refills: 2 | Status: SHIPPED | OUTPATIENT
Start: 2024-10-07

## 2024-10-07 RX ORDER — TIRZEPATIDE 12.5 MG/.5ML
12.5 INJECTION, SOLUTION SUBCUTANEOUS
Qty: 6 ML | Refills: 2 | Status: SHIPPED | OUTPATIENT
Start: 2024-10-07 | End: 2024-10-07 | Stop reason: SDUPTHER

## 2024-10-10 ENCOUNTER — OFFICE VISIT (OUTPATIENT)
Dept: CARDIOLOGY | Facility: CLINIC | Age: 49
End: 2024-10-10
Payer: COMMERCIAL

## 2024-10-10 VITALS
BODY MASS INDEX: 28.49 KG/M2 | HEIGHT: 65 IN | HEART RATE: 85 BPM | WEIGHT: 171 LBS | DIASTOLIC BLOOD PRESSURE: 85 MMHG | SYSTOLIC BLOOD PRESSURE: 126 MMHG

## 2024-10-10 DIAGNOSIS — E11.9 TYPE 2 DIABETES MELLITUS WITHOUT COMPLICATION, WITHOUT LONG-TERM CURRENT USE OF INSULIN (MULTI): Primary | ICD-10-CM

## 2024-10-10 DIAGNOSIS — E78.5 DYSLIPIDEMIA: ICD-10-CM

## 2024-10-10 DIAGNOSIS — E78.5 HYPERLIPIDEMIA, UNSPECIFIED HYPERLIPIDEMIA TYPE: ICD-10-CM

## 2024-10-10 DIAGNOSIS — E66.09 OBESITY DUE TO EXCESS CALORIES WITHOUT SERIOUS COMORBIDITY, UNSPECIFIED CLASS: ICD-10-CM

## 2024-10-10 PROCEDURE — 3044F HG A1C LEVEL LT 7.0%: CPT | Performed by: INTERNAL MEDICINE

## 2024-10-10 PROCEDURE — 3008F BODY MASS INDEX DOCD: CPT | Performed by: INTERNAL MEDICINE

## 2024-10-10 PROCEDURE — 3049F LDL-C 100-129 MG/DL: CPT | Performed by: INTERNAL MEDICINE

## 2024-10-10 PROCEDURE — 99214 OFFICE O/P EST MOD 30 MIN: CPT | Performed by: INTERNAL MEDICINE

## 2024-10-10 PROCEDURE — 3079F DIAST BP 80-89 MM HG: CPT | Performed by: INTERNAL MEDICINE

## 2024-10-10 PROCEDURE — 3074F SYST BP LT 130 MM HG: CPT | Performed by: INTERNAL MEDICINE

## 2024-10-10 PROCEDURE — 1036F TOBACCO NON-USER: CPT | Performed by: INTERNAL MEDICINE

## 2024-10-10 RX ORDER — ATORVASTATIN CALCIUM 20 MG/1
20 TABLET, FILM COATED ORAL DAILY
Qty: 90 TABLET | Refills: 3 | Status: SHIPPED | OUTPATIENT
Start: 2024-10-10 | End: 2025-10-09

## 2024-10-10 ASSESSMENT — ENCOUNTER SYMPTOMS
GASTROINTESTINAL NEGATIVE: 1
CONSTITUTIONAL NEGATIVE: 1
PSYCHIATRIC NEGATIVE: 1
ENDOCRINE NEGATIVE: 1
MUSCULOSKELETAL NEGATIVE: 1
ALLERGIC/IMMUNOLOGIC NEGATIVE: 1
CARDIOVASCULAR NEGATIVE: 1
NEUROLOGICAL NEGATIVE: 1
HEMATOLOGIC/LYMPHATIC NEGATIVE: 1
EYES NEGATIVE: 1
RESPIRATORY NEGATIVE: 1

## 2024-10-10 ASSESSMENT — PATIENT HEALTH QUESTIONNAIRE - PHQ9
1. LITTLE INTEREST OR PLEASURE IN DOING THINGS: NOT AT ALL
SUM OF ALL RESPONSES TO PHQ9 QUESTIONS 1 AND 2: 0
2. FEELING DOWN, DEPRESSED OR HOPELESS: NOT AT ALL

## 2024-10-10 ASSESSMENT — COLUMBIA-SUICIDE SEVERITY RATING SCALE - C-SSRS
2. HAVE YOU ACTUALLY HAD ANY THOUGHTS OF KILLING YOURSELF?: NO
6. HAVE YOU EVER DONE ANYTHING, STARTED TO DO ANYTHING, OR PREPARED TO DO ANYTHING TO END YOUR LIFE?: NO
1. IN THE PAST MONTH, HAVE YOU WISHED YOU WERE DEAD OR WISHED YOU COULD GO TO SLEEP AND NOT WAKE UP?: NO

## 2024-10-10 NOTE — PROGRESS NOTES
[facility]-Met[facility] ([facility]) Note    Reason for Visit: Follow-up (Pt is here for F/U ).  Referring Clinician: No ref. provider found     History of Present Illness: Mrs. Guerrero is a 48-year-old woman with history of type 2 diabetes mellitus, obesity, and hyperlipidemia here for a follow-up visit.  Since her last visit she has been doing really well and has lost approximately 30 pounds since working with Dr. Davila.  Her hemoglobin A1c is less than 5% and she is lost a total of about 50 pounds since starting her program.  She is not having exertional chest pain, shortness of breath, palpitations, or syncope.  She is a non-smoker.  She is adherent to her atorvastatin without any adverse effects.    Past Medical History:  She has a past medical history of Obstructive sleep apnea (adult) (pediatric) (02/23/2017), Personal history of diseases of the skin and subcutaneous tissue (02/04/2015), Personal history of other diseases of the musculoskeletal system and connective tissue (02/04/2015), Personal history of other endocrine, nutritional and metabolic disease (01/22/2015), Sprain of medial collateral ligament of unspecified knee, initial encounter (04/22/2014), and Unspecified tear of unspecified meniscus, current injury, unspecified knee, initial encounter (04/22/2014).    Past Surgical History:  She has a past surgical history that includes Knee arthroscopy w/ debridement (04/22/2014) and Hysteroscopy (01/22/2015).    Social History:  She reports that she has never smoked. She has never used smokeless tobacco. She reports that she does not currently use alcohol. She reports that she does not use drugs.    Family History:  Family History   Problem Relation Name Age of Onset    Diabetes Mother      Heart disease Mother         Allergies:  Penicillin    Outpatient Medications:  Current Outpatient Medications   Medication Instructions    atorvastatin  "(Lipitor) 20 mg tablet TAKE 1 TABLET BY MOUTH DAILY AS DIRECTED    cetirizine (ZYRTEC) 10 mg, oral, Daily    ergocalciferol (Vitamin D-2) 1.25 MG (66611 UT) capsule oral    montelukast (SINGULAIR) 10 mg, oral, Daily    Mounjaro 12.5 mg, subcutaneous, Every 7 days    multivitamin tablet 1 tablet, oral, Daily    naproxen sodium (ALEVE) 220 mg, oral, Every 12 hours       Review of Systems:  Review of Systems   Constitutional: Negative.   HENT: Negative.     Eyes: Negative.    Cardiovascular: Negative.    Respiratory: Negative.     Endocrine: Negative.    Hematologic/Lymphatic: Negative.    Skin: Negative.    Musculoskeletal: Negative.    Gastrointestinal: Negative.    Genitourinary: Negative.    Neurological: Negative.    Psychiatric/Behavioral: Negative.     Allergic/Immunologic: Negative.        Last Recorded Vitals:  Vitals:    10/10/24 1625   BP: 126/85   Pulse: 85   Weight: 77.6 kg (171 lb)   Height: 1.651 m (5' 5\")       Physical Examination:  General: Well appearing, well-nourished, in no acute distress.  HEENT: Normocephalic atraumatic, pupils equal and reactive to light, extraocular muscles intact, no conjunctival injection, oropharynx clear without exudates.  Neck: Normal carotid arterial pulses, no arterial bruits, no thyromegaly.  Cardiac: Regular rhythm and normal heart rate.  S1, S2 present and normal.  No murmurs, rubs, or gallops.  PMI is nondisplaced. Jugular venous pulsations are normal.  Pulmonary: Normal breath sounds, no increased work of breathing, no wheezes or crackles.  GI: Normal bowel sounds, abdominal aorta not enlarged, no hepatosplenomegaly, no abdominal bruits.  Lower extremities: No cyanosis, clubbing, or edema.  No xanthelasma present. Normal distal pulses.  Skin: Skin intact. No significant rashes or lesions present.  Neuro: Alert and oriented x 3, normal attention and cognition, no focal motor or sensory neurologic deficits.  Psych: Normal affect and mood.  Musculoskeletal: Normal " "gait normal muscle tone.    Laboratory Studies:  Lab Results   Component Value Date    GLUCOSE 72 (L) 09/05/2024    CALCIUM 9.3 09/05/2024     09/05/2024    K 4.2 09/05/2024    CO2 27 09/05/2024     09/05/2024    BUN 18 09/05/2024    CREATININE 0.75 09/05/2024     Lab Results   Component Value Date    ALT 17 09/05/2024    AST 15 09/05/2024    ALKPHOS 70 09/05/2024    BILITOT 0.5 09/05/2024         Lab Results   Component Value Date    CHOL 178 09/05/2024    CHOL 168 02/01/2023    CHOL 116 02/15/2022     Lab Results   Component Value Date    HDL 41.3 09/05/2024    HDL 46.3 02/01/2023    HDL 40.8 02/15/2022     Lab Results   Component Value Date    LDLCALC 114 (H) 09/05/2024     Lab Results   Component Value Date    TRIG 116 09/05/2024    TRIG 116 02/01/2023    TRIG 81 02/15/2022     No components found for: \"CHOLHDL\"  Lab Results   Component Value Date    HGBA1C 4.7 09/05/2024     UACR 5.7 mg/g    FIB-4 Calculation: 0.47 at 9/5/2024  8:13 AM  Calculated from:  SGOT/AST: 15 U/L at 9/5/2024  8:13 AM  SGPT/ALT: 17 U/L at 9/5/2024  8:13 AM  Platelets: 378 x10*3/uL at 9/5/2024  8:13 AM  Age: 48 years      Assessment and Plan:  Problem List Items Addressed This Visit          Endocrine/Metabolic    Type 2 diabetes mellitus without complication, without long-term current use of insulin (Multi) - Primary     Other Visit Diagnoses       Obesity due to excess calories without serious comorbidity, unspecified class        Dyslipidemia              Mrs. Guerrero is a 48-year-old woman with history of type 2 diabetes mellitus, obesity, and hyperlipidemia here for a follow-up visit.  She is asymptomatic from a cardiovascular standpoint and has made great strides in lowering her cardiovascular risk and improving her diabetes and related complications such as obesity.  She is lost about 50 pounds since starting her [employer] program and she is currently on a GLP-1/GIP receptor agonist and high intensity statin with " well-controlled risk factor levels.  She will continue doing great work along with lifestyle modification and I do not recommend any medication changes today.  We have refilled her atorvastatin for her request.  I recommend that she continue to follow-up with a primary care physician going forward and I be happy to see her back should any new cardiovascular issues arise.  Please do not hesitate to call or contact me with any questions or concerns.    I spent 35 minutes of face-to-face time with the patient, with more than 50% of that time spent in counseling and/or coordination of care.    Jonny Shipley MD, FAHA, FACC  Director,  [facility]  Director,  [facility] Program  Associate Professor of Medicine  [employer]

## 2024-10-18 ENCOUNTER — APPOINTMENT (OUTPATIENT)
Dept: ALLERGY | Facility: CLINIC | Age: 49
End: 2024-10-18
Payer: COMMERCIAL

## 2024-10-18 DIAGNOSIS — J30.2 SEASONAL ALLERGIES: ICD-10-CM

## 2024-10-18 PROCEDURE — 95117 IMMUNOTHERAPY INJECTIONS: CPT | Performed by: ALLERGY & IMMUNOLOGY

## 2024-10-21 ENCOUNTER — APPOINTMENT (OUTPATIENT)
Dept: ALLERGY | Facility: CLINIC | Age: 49
End: 2024-10-21
Payer: COMMERCIAL

## 2024-10-21 DIAGNOSIS — J30.2 SEASONAL ALLERGIES: ICD-10-CM

## 2024-10-21 PROCEDURE — 95117 IMMUNOTHERAPY INJECTIONS: CPT | Performed by: ALLERGY & IMMUNOLOGY

## 2024-11-01 ENCOUNTER — APPOINTMENT (OUTPATIENT)
Dept: ALLERGY | Facility: CLINIC | Age: 49
End: 2024-11-01
Payer: COMMERCIAL

## 2024-11-01 DIAGNOSIS — J30.1 ALLERGIC RHINITIS DUE TO POLLEN, UNSPECIFIED SEASONALITY: ICD-10-CM

## 2024-11-01 PROCEDURE — 95117 IMMUNOTHERAPY INJECTIONS: CPT | Performed by: ALLERGY & IMMUNOLOGY

## 2024-11-05 NOTE — PROGRESS NOTES
Subjective  Niya Guerrero is a 49 y.o. female with a hx of T2DM, HLD, GERD, eczema, s/p knee surgeries bilaterally, MELANI who presents for weight management and obesity.    Virtual:  Current Plan  1. Nutrition: Mediterranean Diet. Trying new vegetables. Ordered Hello Fresh.      2. Sleep: Stable      3. Stress: Stable     4. Exercise: Incorporating consistently- walking at different places with puppy       5. Appetite control: Stable  Obesity medication: Mounjaro- 12.5mg     6. Prior Goals: Met     New Goals: Nutrition- Keep trying new foods and recipes.     Weight trend:    Wt Readings from Last 3 Encounters:   10/10/24 77.6 kg (171 lb)   10/02/24 77.6 kg (171 lb)   09/03/24 78.9 kg (174 lb)       Recent weight:      Current Outpatient Medications:     atorvastatin (Lipitor) 20 mg tablet, TAKE 1 TABLET BY MOUTH DAILY AS DIRECTED, Disp: 90 tablet, Rfl: 3    cetirizine (ZyrTEC) 10 mg tablet, Take 1 tablet (10 mg) by mouth once daily., Disp: , Rfl:     ergocalciferol (Vitamin D-2) 1.25 MG (46636 UT) capsule, Take by mouth., Disp: , Rfl:     montelukast (Singulair) 10 mg tablet, Take 1 tablet (10 mg) by mouth once daily., Disp: 90 tablet, Rfl: 3    multivitamin tablet, Take 1 tablet by mouth once daily., Disp: , Rfl:     naproxen sodium (Aleve) 220 mg capsule, Take 220 mg by mouth every 12 hours., Disp: , Rfl:     tirzepatide (Mounjaro) 12.5 mg/0.5 mL pen injector, Inject 12.5 mg under the skin every 7 days., Disp: 6 mL, Rfl: 2    ROS:  System: normal  Eyes : no visual changes  Neck : no tenderness, no new lumps/bumps  Respiratory : no SOB  Cardiovascular : no chest pain, no palpitations  Gastro-Intestinal : no abdominal concerns  Neurological : no numbness or tingling in the extremities  Musculoskeletal : no joint paint, no muscle pain  Skin : no unusual rashes  Psychiatric : no depression, no anxiety  See HPI for Endocrine ROS    Past Medical History:   Diagnosis Date    Obstructive sleep apnea (adult) (pediatric)  "02/23/2017    Obstructive sleep apnea    Personal history of diseases of the skin and subcutaneous tissue 02/04/2015    History of urticaria    Personal history of other diseases of the musculoskeletal system and connective tissue 02/04/2015    History of arthritis    Personal history of other endocrine, nutritional and metabolic disease 01/22/2015    History of hypokalemia    Sprain of medial collateral ligament of unspecified knee, initial encounter 04/22/2014    Tear of MCL (medial collateral ligament) of knee    Unspecified tear of unspecified meniscus, current injury, unspecified knee, initial encounter 04/22/2014    Meniscus tear       Past Surgical History:   Procedure Laterality Date    HYSTEROSCOPY  01/22/2015    Hysteroscopy With Endometrial Ablation    KNEE ARTHROSCOPY W/ DEBRIDEMENT  04/22/2014    Knee Arthroscopy (Therapeutic)       Social History     Socioeconomic History    Marital status:      Spouse name: Not on file    Number of children: Not on file    Years of education: Not on file    Highest education level: Not on file   Occupational History    Not on file   Tobacco Use    Smoking status: Never    Smokeless tobacco: Never   Vaping Use    Vaping status: Never Used   Substance and Sexual Activity    Alcohol use: Not Currently    Drug use: Never    Sexual activity: Not on file   Other Topics Concern    Not on file   Social History Narrative    Not on file     Social Drivers of Health     Financial Resource Strain: Not on file   Food Insecurity: Not on file   Transportation Needs: Not on file   Physical Activity: Not on file   Stress: Not on file   Social Connections: Not on file   Intimate Partner Violence: Not on file   Housing Stability: Not on file       Objective      Physical Exam:  Height 1.651 m (5' 5\"), weight 77.6 kg (171 lb).  General : alert and oriented X3, no acute distress  Eyes : EOMI   BMI: 28.46kg/m2    Assessment/Plan  Niya Guerrero is a 49 y.o. female with a hx of T2DM, " HLD, GERD, eczema, s/p knee surgeries bilaterally, MELANI who presents for weight management and obesity.     1. Weight Management : Reviewed the principles of energy metabolism, caloric intake and expenditure, and rationale for a treatment program.  Also reinforced need for reduced calorie, low fat diet and increased physical activity.     We reviewed the possibility of starting an interdisciplinary lifestyle intervention program involving improvement of the diet, a personalized exercise program, efforts to reduce the stress and the possibility of using appetite suppressant medications in an effort to help with the weight loss process.  The patient expressed interest in the plan.     2. Nutrition : I discussed trying one of the diet approaches we have here in the program : Mediterranean lifestyle, ketosis diet.     3/29/23 : 199lb, 33.16kg/m2  5/9/23 : 193lb, 32.2kg/m2  6/14/23 : 192lb, 31.97kg/m2  7/26/23: 182.8lb, 30.37kg/m2  9/27/23: 176.6lb, 29.35kg/m2  1/9/24: 171.8lb, 28.59kg/m2  4/10/24: 169lb, 28.12kg/m2  5/8/24: 169lb, 28.12kg/m2  6/11/24: 170lb, 28.29kg/m2  7/16/24: 169.0 lb, 76.7 kg/m2   8/20/24: 171lb,  30.29kg/m2   10/02/24: 171lb, 28.46kg/m2   11/06/24: 171lb, 28.46kg/m2     3. Sleep : is fine.     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     5. Exercise : has a new puppy that is 3 months old - and she is doing a lot of walking of him.     6. Appetite : stable.     7. DM : Managed by Dr. Shipley.  switched from ozempic 2mg/wk to Mounjaro now on 12.5mg/wk     8. Goal : to continue to try new veggies.    Follow up in a group visit.  Philip Peres MD

## 2024-11-06 ENCOUNTER — APPOINTMENT (OUTPATIENT)
Dept: ENDOCRINOLOGY | Facility: CLINIC | Age: 49
End: 2024-11-06
Payer: COMMERCIAL

## 2024-11-06 VITALS — HEIGHT: 65 IN | WEIGHT: 171 LBS | BODY MASS INDEX: 28.49 KG/M2

## 2024-11-06 DIAGNOSIS — E66.3 OVERWEIGHT: Primary | ICD-10-CM

## 2024-11-06 DIAGNOSIS — Z76.89 ENCOUNTER FOR WEIGHT MANAGEMENT: ICD-10-CM

## 2024-11-06 PROCEDURE — 3008F BODY MASS INDEX DOCD: CPT | Performed by: INTERNAL MEDICINE

## 2024-11-06 PROCEDURE — 99215 OFFICE O/P EST HI 40 MIN: CPT | Performed by: INTERNAL MEDICINE

## 2024-11-06 PROCEDURE — 3044F HG A1C LEVEL LT 7.0%: CPT | Performed by: INTERNAL MEDICINE

## 2024-11-06 PROCEDURE — 3049F LDL-C 100-129 MG/DL: CPT | Performed by: INTERNAL MEDICINE

## 2024-11-08 ENCOUNTER — APPOINTMENT (OUTPATIENT)
Dept: ALLERGY | Facility: CLINIC | Age: 49
End: 2024-11-08
Payer: COMMERCIAL

## 2024-11-08 DIAGNOSIS — J30.2 SEASONAL ALLERGIES: ICD-10-CM

## 2024-11-08 PROCEDURE — 95117 IMMUNOTHERAPY INJECTIONS: CPT | Performed by: ALLERGY & IMMUNOLOGY

## 2024-11-15 ENCOUNTER — APPOINTMENT (OUTPATIENT)
Dept: ALLERGY | Facility: CLINIC | Age: 49
End: 2024-11-15
Payer: COMMERCIAL

## 2024-11-15 DIAGNOSIS — J30.2 SEASONAL ALLERGIES: ICD-10-CM

## 2024-11-15 PROCEDURE — 95117 IMMUNOTHERAPY INJECTIONS: CPT | Performed by: ALLERGY & IMMUNOLOGY

## 2024-11-22 ENCOUNTER — APPOINTMENT (OUTPATIENT)
Dept: ALLERGY | Facility: CLINIC | Age: 49
End: 2024-11-22
Payer: COMMERCIAL

## 2024-11-25 ENCOUNTER — APPOINTMENT (OUTPATIENT)
Dept: ALLERGY | Facility: CLINIC | Age: 49
End: 2024-11-25
Payer: COMMERCIAL

## 2024-11-25 DIAGNOSIS — J30.2 SEASONAL ALLERGIES: ICD-10-CM

## 2024-11-25 PROCEDURE — 95117 IMMUNOTHERAPY INJECTIONS: CPT | Performed by: ALLERGY & IMMUNOLOGY

## 2024-12-04 ENCOUNTER — APPOINTMENT (OUTPATIENT)
Dept: ENDOCRINOLOGY | Facility: CLINIC | Age: 49
End: 2024-12-04
Payer: COMMERCIAL

## 2024-12-06 ENCOUNTER — CLINICAL SUPPORT (OUTPATIENT)
Dept: ALLERGY | Facility: CLINIC | Age: 49
End: 2024-12-06
Payer: COMMERCIAL

## 2024-12-06 DIAGNOSIS — J30.2 SEASONAL ALLERGIES: ICD-10-CM

## 2024-12-06 PROCEDURE — 95117 IMMUNOTHERAPY INJECTIONS: CPT | Performed by: ALLERGY & IMMUNOLOGY

## 2024-12-13 ENCOUNTER — APPOINTMENT (OUTPATIENT)
Dept: ALLERGY | Facility: CLINIC | Age: 49
End: 2024-12-13
Payer: COMMERCIAL

## 2024-12-13 DIAGNOSIS — J30.2 SEASONAL ALLERGIES: ICD-10-CM

## 2024-12-13 PROCEDURE — 95117 IMMUNOTHERAPY INJECTIONS: CPT | Performed by: ALLERGY & IMMUNOLOGY

## 2024-12-20 ENCOUNTER — APPOINTMENT (OUTPATIENT)
Dept: ALLERGY | Facility: CLINIC | Age: 49
End: 2024-12-20
Payer: COMMERCIAL

## 2024-12-20 DIAGNOSIS — J30.2 SEASONAL ALLERGIES: ICD-10-CM

## 2024-12-20 PROCEDURE — 95117 IMMUNOTHERAPY INJECTIONS: CPT | Performed by: ALLERGY & IMMUNOLOGY

## 2024-12-23 ENCOUNTER — APPOINTMENT (OUTPATIENT)
Dept: ALLERGY | Facility: CLINIC | Age: 49
End: 2024-12-23
Payer: COMMERCIAL

## 2024-12-23 PROCEDURE — RXMED WILLOW AMBULATORY MEDICATION CHARGE

## 2024-12-26 ENCOUNTER — PHARMACY VISIT (OUTPATIENT)
Dept: PHARMACY | Facility: CLINIC | Age: 49
End: 2024-12-26
Payer: COMMERCIAL

## 2024-12-26 DIAGNOSIS — E11.9 TYPE 2 DIABETES MELLITUS WITHOUT COMPLICATION, WITHOUT LONG-TERM CURRENT USE OF INSULIN (MULTI): Primary | ICD-10-CM

## 2024-12-27 ENCOUNTER — OFFICE VISIT (OUTPATIENT)
Dept: ORTHOPEDIC SURGERY | Facility: CLINIC | Age: 49
End: 2024-12-27
Payer: COMMERCIAL

## 2024-12-27 DIAGNOSIS — M17.0 BILATERAL PRIMARY OSTEOARTHRITIS OF KNEE: Primary | ICD-10-CM

## 2024-12-27 PROCEDURE — 20610 DRAIN/INJ JOINT/BURSA W/O US: CPT | Mod: 50 | Performed by: PHYSICIAN ASSISTANT

## 2024-12-27 PROCEDURE — 3044F HG A1C LEVEL LT 7.0%: CPT | Performed by: PHYSICIAN ASSISTANT

## 2024-12-27 PROCEDURE — 3049F LDL-C 100-129 MG/DL: CPT | Performed by: PHYSICIAN ASSISTANT

## 2024-12-27 PROCEDURE — 2500000004 HC RX 250 GENERAL PHARMACY W/ HCPCS (ALT 636 FOR OP/ED): Performed by: PHYSICIAN ASSISTANT

## 2024-12-27 PROCEDURE — 1036F TOBACCO NON-USER: CPT | Performed by: PHYSICIAN ASSISTANT

## 2024-12-27 RX ORDER — TRIAMCINOLONE ACETONIDE 40 MG/ML
40 INJECTION, SUSPENSION INTRA-ARTICULAR; INTRAMUSCULAR
Status: COMPLETED | OUTPATIENT
Start: 2024-12-27 | End: 2024-12-27

## 2024-12-27 ASSESSMENT — PAIN - FUNCTIONAL ASSESSMENT: PAIN_FUNCTIONAL_ASSESSMENT: 0-10

## 2024-12-27 NOTE — PROGRESS NOTES
Subjective    Patient ID: Niya Guerrero is a 49 y.o. female.    Chief Complaint: Injections of the Left Knee and Injections of the Right Knee           HPI:  Niya Guerrero is a 49 y.o. female who presents today for planned intra-articular steroid injection to both knees.  She typically gets about 3 months of improvement from the injections.    ROS  Constitutional: No fever, no chills, not feeling tired, no recent weight gain and no recent weight loss  ENT: No nosebleeds  Cardiovascular: No chest pain  Respiratory: No shortness of breath and no cough  Gastrointestinal: No abdominal pain, no nausea, no diarrhea, and no vomiting  Musculoskeletal: No arthralgias  Integumentary: No rashes and no skin lesions  Neurological: No headache  Psychiatric: No sleep disturbances no depression  Endocrine: No muscle weakness and no muscle cramps  Hematologic/lymphatic: No swelling glands and no tendency for easy bruising    Past Medical History:   Diagnosis Date    Obstructive sleep apnea (adult) (pediatric) 02/23/2017    Obstructive sleep apnea    Personal history of diseases of the skin and subcutaneous tissue 02/04/2015    History of urticaria    Personal history of other diseases of the musculoskeletal system and connective tissue 02/04/2015    History of arthritis    Personal history of other endocrine, nutritional and metabolic disease 01/22/2015    History of hypokalemia    Sprain of medial collateral ligament of unspecified knee, initial encounter 04/22/2014    Tear of MCL (medial collateral ligament) of knee    Unspecified tear of unspecified meniscus, current injury, unspecified knee, initial encounter 04/22/2014    Meniscus tear        Past Surgical History:   Procedure Laterality Date    HYSTEROSCOPY  01/22/2015    Hysteroscopy With Endometrial Ablation    KNEE ARTHROSCOPY W/ DEBRIDEMENT  04/22/2014    Knee Arthroscopy (Therapeutic)          Current Outpatient Medications:     atorvastatin (Lipitor) 20 mg tablet, TAKE 1  TABLET BY MOUTH DAILY AS DIRECTED, Disp: 90 tablet, Rfl: 3    cetirizine (ZyrTEC) 10 mg tablet, Take 1 tablet (10 mg) by mouth once daily., Disp: , Rfl:     ergocalciferol (Vitamin D-2) 1.25 MG (27449 UT) capsule, Take by mouth., Disp: , Rfl:     montelukast (Singulair) 10 mg tablet, Take 1 tablet (10 mg) by mouth once daily., Disp: 90 tablet, Rfl: 3    multivitamin tablet, Take 1 tablet by mouth once daily., Disp: , Rfl:     naproxen sodium (Aleve) 220 mg capsule, Take 220 mg by mouth every 12 hours., Disp: , Rfl:     tirzepatide (Mounjaro) 12.5 mg/0.5 mL pen injector, Inject 12.5 mg under the skin every 7 days., Disp: 6 mL, Rfl: 2     Allergies   Allergen Reactions    Penicillin Hives        Social Connections: Not on file          Objective   49-year-old female well appearing in no acute distress. Alert and oriented ×3.  Skin intact bilateral lower extremities.   Normal tandem gait. Coordination and balance intact.  Bilateral lower extremity compartments supple.  2+ DP/PT pulses bilaterally.  Injection site both knees is clear    L Inj/Asp: bilateral knee on 12/27/2024 9:13 AM  Indications: pain  Details: 22 G needle, superolateral approach  Medications (Right): 40 mg triamcinolone acetonide 40 mg/mL  Medications (Left): 40 mg triamcinolone acetonide 40 mg/mL      Knee injection:    Right knee intra-articular injection is offered for therapeutic purposes.  Indication is knee pain.  Risks and benefits of the injection were discussed.  After questions were answered, consent was provided to proceed.  Under sterile conditions, the knee was injected through a superolateral patellar site with 40 mg Kenalog and 5 cc lidocaine without complication.  The patient tolerated the injection well.  A dressing was applied.  Post injection instructions were given.    Left knee intra-articular injection is offered for therapeutic purposes.  Indication is knee pain.  Risks and benefits of the injection were discussed.  After  questions were answered, consent was provided to proceed.  Under sterile conditions, the knee was injected through a superolateral patellar site with 40 mg Kenalog and 5 cc lidocaine without complication.  The patient tolerated the injection well.  A dressing was applied.  Post injection instructions were given.            Assessment/Plan   Encounter Diagnoses:  Bilateral primary osteoarthritis of knee    No orders of the defined types were placed in this encounter.      Hopefully she does well with today's injections.  We can repeat them in 3 months if needed.    This office note was dictated using Dragon voice to text software and was not proofread for spelling or grammatical errors

## 2024-12-30 ENCOUNTER — APPOINTMENT (OUTPATIENT)
Dept: PHARMACY | Facility: HOSPITAL | Age: 49
End: 2024-12-30
Payer: COMMERCIAL

## 2024-12-30 DIAGNOSIS — E11.9 TYPE 2 DIABETES MELLITUS WITHOUT COMPLICATION, WITHOUT LONG-TERM CURRENT USE OF INSULIN (MULTI): Primary | ICD-10-CM

## 2024-12-30 NOTE — Clinical Note
Employee $0 Diabetes Meds/Supplies Program (VBID) visit completed. A1c excellently controlled.  Comorbid condition management/prevention:  -Concern for upward trend of LDL over last 2 years, now above goal. Pt reports still taking atorvastatin 20 mg. Consider increasing statin. -Recommend addition of ACE/ARB if BP remains elevated, pending repeat UACR.

## 2024-12-30 NOTE — PATIENT INSTRUCTIONS
Thank you for entrusting your care to the Clinical Pharmacy Team! We look forward to seeing you again soon.  Please call your clinical pharmacist with any questions or concerns.    You are enrolled in the [facility] Employee Value Based Insurance Design (VBID) program. This program allows you to receive for $0 co-pays on diabetes medications/supplies.  To maintain your eligibility for this program, you must:  Maintain  employee insurance coverage  Fill prescriptions at a  pharmacy for pick-up or home delivery  Complete a visit with a clinical pharmacist at least once annually    Chelsey Steward, PharmD  P: [phone]    To schedule an appointment, please contact:  P: [phone]

## 2024-12-30 NOTE — PROGRESS NOTES
[facility] (ID)    Niya Guerrero is a 49 y.o. female referred to Clinical Pharmacy Team to complete a comprehensive medication review (CMR) with a pharmacist as part of the [employer] Based [employer] (VB[employer]) diabetes program.  Pharmacy team may also provide assistance in diabetes management per discussion with referring provider and/or endocrinology. Referring Provider: Cliff Blackwood*    Does patient follow with Endocrinology: Yes  Endocrinology Provider Name: Cliff Blackwood*    Subjective   Allergies   Allergen Reactions    Penicillin Hives       [facility]  [address], [address]  [facility] [address]  Phone: [phone] Fax: [phone]    [facility]  [address], [address]  [facility] [address]  Phone: [phone] Fax: [phone]    Diabetes  She presents for her follow-up diabetic visit. She has type 2 diabetes mellitus. Her disease course has been stable (well controlled). There are no hypoglycemic complications. Risk factors for coronary artery disease include diabetes mellitus and stress. She is compliant with treatment all of the time. Her weight is stable (decreased steadily from 225 lbs in 2020, stable last 6 months). She is following a generally healthy diet. An ACE inhibitor/angiotensin II receptor blocker is not being taken. She does not see a podiatrist.Eye exam is not current (last known 2019).     Pertinent PMH Review:  PMH of Pancreatitis: No  PMH of Retinopathy: No  PMH of Urinary Tract Infections: No  PMH of MTC: No    HOME MONITORING  Monitoring Device: Fingerstick glucometer  Monitoring Frequency: PRN     Current home BG readings: BG log not present at today's visit, pt denies any s/sx of hypo/hyperglycemia  Any episodes of hypoglycemia? No, denies .     Objective     BP Readings from Last 6 Encounters:   10/10/24 126/85   07/26/23 116/81   06/14/23 120/82   05/09/23 120/82   03/29/23  "120/84   03/09/23 (!) 134/91      Daily Weight  11/06/24 : 77.6 kg (171 lb)  10/10/24 : 77.6 kg (171 lb)  10/02/24 : 77.6 kg (171 lb)  09/03/24 : 78.9 kg (174 lb)  08/20/24 : 77.6 kg (171 lb)  07/16/24 : 76.7 kg (169 lb)     LAB  Lab Results   Component Value Date    BILITOT 0.5 09/05/2024    CALCIUM 9.3 09/05/2024    CO2 27 09/05/2024     09/05/2024    CREATININE 0.75 09/05/2024    GLUCOSE 72 (L) 09/05/2024    ALKPHOS 70 09/05/2024    K 4.2 09/05/2024    PROT 6.9 09/05/2024     09/05/2024    AST 15 09/05/2024    ALT 17 09/05/2024    BUN 18 09/05/2024    ANIONGAP 10 09/05/2024    ALBUMIN 4.0 09/05/2024    GFRF >90 02/01/2023     Lab Results   Component Value Date    TRIG 116 09/05/2024    CHOL 178 09/05/2024    LDLCALC 114 (H) 09/05/2024    HDL 41.3 09/05/2024     Lab Results   Component Value Date    HGBA1C 4.7 09/05/2024     Estimated body mass index is 28.46 kg/m² as calculated from the following:    Height as of 11/6/24: 1.651 m (5' 5\").    Weight as of 11/6/24: 77.6 kg (171 lb).     Current Outpatient Medications on File Prior to Visit   Medication Sig Dispense Refill    atorvastatin (Lipitor) 20 mg tablet TAKE 1 TABLET BY MOUTH DAILY AS DIRECTED 90 tablet 3    ergocalciferol (Vitamin D-2) 1.25 MG (10507 UT) capsule Take 1 capsule (50,000 Units) by mouth 1 (one) time per week.      montelukast (Singulair) 10 mg tablet Take 1 tablet (10 mg) by mouth once daily. 90 tablet 3    multivitamin tablet Take 1 tablet by mouth once daily.      naproxen sodium (Aleve) 220 mg capsule Take 220 mg by mouth every 12 hours if needed (joint pain).      tirzepatide (Mounjaro) 12.5 mg/0.5 mL pen injector Inject 12.5 mg under the skin every 7 days. 6 mL 2    [DISCONTINUED] cetirizine (ZyrTEC) 10 mg tablet Take 1 tablet (10 mg) by mouth once daily.       No current facility-administered medications on file prior to visit.      MEDICATION RECONCILIATION  Added: none  Changed:   Vitamin D2 - sig/frequency updated to once " weekly per pt  Removed:   Cetirizine - pt reports no longer taking, replaced by montelukast per allergist    Drug Interactions:  None warranting change in therapy at this time    CURRENT DIABETES PHARMACOTHERAPY  Mounjaro 12.5 injected subcutaneously once weekly    PREVENTATIVE PHARMACOTHERAPY  Statin? yes  LDL: not at goal - last = 114 mg/dL on 9/5/24, worsened from 2/1/23 (99 mg/dL) and 2/15/22 (59 mg/dL)  ACE-I/ARB? no  BP: borderline, goal of < 130/80, diastolic routinely 80-84 mmHg, continue monitoring  UACR: normal (< 30 mg/g)  Aspirin?  no    Assessment/Plan   Problem List Items Addressed This Visit       Type 2 diabetes mellitus without complication, without long-term current use of insulin (Multi) - Primary    Relevant Orders    Referral to Clinical Pharmacy      Diabetes:  Patient's Type 2 diabetes is well controlled with most recent A1c of 4.7% on 9/5/24 (Goal < 7%). Overall, doing very well on current regimen, denies side effects or other concerns w/ medication. No changes recommended at today's visit, pt to continue following with endo for management.  Comorbidity/Complication Regimen: needs optimization/observation  LDL trend increasing over last 2 years, above goal, consider increasing statin and lifestyle intervention   Continue monitoring BP and UACR, consider ACE/ARB if elevated    Comprehensive Medication Review:  Reviewed all medications on patient medication list in EMR. Discussed indication, administration, MOA and possible side effects to medications. Answered all patient questions and concerns.   Patient denies side effects with medications.   Adherence is expected to be Good.   Additional concerns with medication list: none    VBID Employee Diabetes Program Enrollment: Renewal  Patient enrolled in  Employee diabetes program for $0 co-pays on diabetes medications/supplies. Renewal enrollment should be active in 2-4 weeks.  Requested VBID enrollment date: 12/17/24  PharmD Management Level:  Level 2   Pharmacy fill location: [facility]    Follow up: 10-12 months for renewal      Chelsey Steward PharmD     Continue all meds under the continuation of care with the referring provider and clinical pharmacy team. Patient/Caregiver was informed they may decline to participate or withdraw from participation in pharmacy services at any time.

## 2025-01-03 ENCOUNTER — APPOINTMENT (OUTPATIENT)
Dept: ALLERGY | Facility: CLINIC | Age: 50
End: 2025-01-03
Payer: COMMERCIAL

## 2025-01-03 DIAGNOSIS — J30.2 SEASONAL ALLERGIES: ICD-10-CM

## 2025-01-03 PROCEDURE — 95117 IMMUNOTHERAPY INJECTIONS: CPT | Performed by: ALLERGY & IMMUNOLOGY

## 2025-01-06 ENCOUNTER — APPOINTMENT (OUTPATIENT)
Dept: ALLERGY | Facility: CLINIC | Age: 50
End: 2025-01-06
Payer: COMMERCIAL

## 2025-01-13 ENCOUNTER — APPOINTMENT (OUTPATIENT)
Dept: ALLERGY | Facility: CLINIC | Age: 50
End: 2025-01-13
Payer: COMMERCIAL

## 2025-01-13 DIAGNOSIS — J30.2 SEASONAL ALLERGIES: ICD-10-CM

## 2025-01-13 PROCEDURE — 95117 IMMUNOTHERAPY INJECTIONS: CPT | Performed by: ALLERGY & IMMUNOLOGY

## 2025-01-15 ENCOUNTER — APPOINTMENT (OUTPATIENT)
Dept: ENDOCRINOLOGY | Facility: CLINIC | Age: 50
End: 2025-01-15
Payer: COMMERCIAL

## 2025-01-15 VITALS — WEIGHT: 175 LBS | BODY MASS INDEX: 29.16 KG/M2 | HEIGHT: 65 IN

## 2025-01-15 DIAGNOSIS — Z76.89 ENCOUNTER FOR WEIGHT MANAGEMENT: ICD-10-CM

## 2025-01-15 DIAGNOSIS — E66.3 OVERWEIGHT: Primary | ICD-10-CM

## 2025-01-15 PROCEDURE — 3008F BODY MASS INDEX DOCD: CPT | Performed by: INTERNAL MEDICINE

## 2025-01-15 PROCEDURE — 99215 OFFICE O/P EST HI 40 MIN: CPT | Performed by: INTERNAL MEDICINE

## 2025-01-20 ENCOUNTER — APPOINTMENT (OUTPATIENT)
Dept: ALLERGY | Facility: CLINIC | Age: 50
End: 2025-01-20
Payer: COMMERCIAL

## 2025-01-23 NOTE — PATIENT INSTRUCTIONS
Oral challenge to amoxicillin is negative.    Let your pharmacist know you may safely take penicillin/amoxicillin products.    Follow up as needed.

## 2025-01-23 NOTE — PROGRESS NOTES
Subjective   Patient ID:   [id number]   Niya Guerrero is a 49 y.o. female who presents for Drug Challenge (Penicillin).    Chief Complaint   Patient presents with    Drug Challenge     Penicillin      Patient presents for oral challenge to amoxicillin.    About 15-20 years ago, patient developed hives after having penicillin around the beginning to middle of the course.      At last visit, 9-3-24, patient had negative skin prick testing to penicillin.  She presents today for oral challenge to amoxicillin.    Patient's mother passed in Dec 2024 from COVID, Parkinson flare and finally PNA.  Her  has been deployed to [place] for 8 months and her nephew will be deployed there soon also.    Objective   /84 (BP Location: Left arm)   Pulse 97   SpO2 100%      Physical Exam  Constitutional:       Appearance: Normal appearance.   HENT:      Head: Normocephalic and atraumatic.      Right Ear: External ear normal. There is no impacted cerumen.      Left Ear: External ear normal. There is no impacted cerumen.      Nose: No congestion or rhinorrhea.   Eyes:      Extraocular Movements: Extraocular movements intact.      Conjunctiva/sclera: Conjunctivae normal.      Pupils: Pupils are equal, round, and reactive to light.   Cardiovascular:      Rate and Rhythm: Normal rate and regular rhythm.      Heart sounds: No murmur heard.     No friction rub. No gallop.   Pulmonary:      Effort: No respiratory distress.      Breath sounds: No wheezing, rhonchi or rales.   Skin:     General: Skin is warm and dry.   Neurological:      Mental Status: She is alert.   Psychiatric:         Mood and Affect: Mood normal.         Behavior: Behavior normal.       Oral challenge to amoxicillin is negative.    Pulmonary Functions Testing Results:    FEV1   Date Value Ref Range Status   01/27/2025 90 liters Final     FVC   Date Value Ref Range Status   01/27/2025 77 liters Final     FEV1/FVC   Date Value Ref Range Status   01/27/2025 115 %  Final      Assessment/Plan       Penicillin adverse reaction  Greater than 15 years ago, patient developed hives after having penicillin around the beginning to middle of the course.  Skin testing to penicillin was negative and OC to amoxicillin will be completed.    Oral challenge to amoxicillin is negative.  She will make her pharmacist aware of results.    Acute cough  While patient was undergoing oral challenge to amoxicillin, she complained of onset of cough.    IO PFT was normal. No signs of allergic reaction    By signing my name below, I, Adriana Bolton, attest that this documentation has been prepared under the direction and in the presence of Marisol Le MD.  All medical record entries made by the Scribe were at my direction and personally dictated by me. I have reviewed the chart and agree that the record accurately reflects my personal performance of the history, physical exam, discussion and plan.

## 2025-01-23 NOTE — ASSESSMENT & PLAN NOTE
Greater than 15 years ago, patient developed hives after having penicillin around the beginning to middle of the course.  Skin testing to penicillin was negative and OC to amoxicillin will be completed.    Oral challenge to amoxicillin is negative.  She will make her pharmacist aware of results.

## 2025-01-27 ENCOUNTER — APPOINTMENT (OUTPATIENT)
Dept: ALLERGY | Facility: CLINIC | Age: 50
End: 2025-01-27
Payer: COMMERCIAL

## 2025-01-27 VITALS — SYSTOLIC BLOOD PRESSURE: 117 MMHG | HEART RATE: 97 BPM | OXYGEN SATURATION: 100 % | DIASTOLIC BLOOD PRESSURE: 84 MMHG

## 2025-01-27 DIAGNOSIS — R05.1 ACUTE COUGH: ICD-10-CM

## 2025-01-27 DIAGNOSIS — T36.0X5D ADVERSE EFFECT OF PENICILLIN, SUBSEQUENT ENCOUNTER: Primary | ICD-10-CM

## 2025-01-27 LAB
FEV1/FVC: 115 %
FEV1: 90 LITERS
FVC: 77 LITERS

## 2025-01-27 PROCEDURE — 94375 RESPIRATORY FLOW VOLUME LOOP: CPT | Performed by: ALLERGY & IMMUNOLOGY

## 2025-01-27 PROCEDURE — 3079F DIAST BP 80-89 MM HG: CPT | Performed by: ALLERGY & IMMUNOLOGY

## 2025-01-27 PROCEDURE — 95076 INGEST CHALLENGE INI 120 MIN: CPT | Performed by: ALLERGY & IMMUNOLOGY

## 2025-01-27 PROCEDURE — 3074F SYST BP LT 130 MM HG: CPT | Performed by: ALLERGY & IMMUNOLOGY

## 2025-01-27 NOTE — ASSESSMENT & PLAN NOTE
While patient was undergoing oral challenge to amoxicillin, she complained of onset of cough.    IO PFT was normal. No signs of allergic reaction

## 2025-01-31 ENCOUNTER — APPOINTMENT (OUTPATIENT)
Dept: ALLERGY | Facility: CLINIC | Age: 50
End: 2025-01-31
Payer: COMMERCIAL

## 2025-02-03 ENCOUNTER — APPOINTMENT (OUTPATIENT)
Dept: ALLERGY | Facility: CLINIC | Age: 50
End: 2025-02-03
Payer: COMMERCIAL

## 2025-02-03 DIAGNOSIS — J30.2 SEASONAL ALLERGIES: ICD-10-CM

## 2025-02-03 PROCEDURE — 95117 IMMUNOTHERAPY INJECTIONS: CPT | Performed by: ALLERGY & IMMUNOLOGY

## 2025-02-05 DIAGNOSIS — E11.9 TYPE 2 DIABETES MELLITUS WITHOUT COMPLICATION, WITHOUT LONG-TERM CURRENT USE OF INSULIN (MULTI): ICD-10-CM

## 2025-02-05 NOTE — TELEPHONE ENCOUNTER
LOV:  1/15/25  NOV: 2/12/25  Please see patient's message below        Should patient be seen first or can you order mammogram?

## 2025-02-06 ENCOUNTER — PHARMACY VISIT (OUTPATIENT)
Dept: PHARMACY | Facility: CLINIC | Age: 50
End: 2025-02-06
Payer: COMMERCIAL

## 2025-02-06 PROCEDURE — RXMED WILLOW AMBULATORY MEDICATION CHARGE

## 2025-02-06 RX ORDER — TIRZEPATIDE 15 MG/.5ML
15 INJECTION, SOLUTION SUBCUTANEOUS
Qty: 2 ML | Refills: 2 | Status: SHIPPED | OUTPATIENT
Start: 2025-02-06

## 2025-02-06 NOTE — TELEPHONE ENCOUNTER
I suggest going on mounjaro 15mg/wk to see if that increase helps her with the added weight.  Is she having hypoglycemia?   This really shouldn't be an issue with the mounjaro, very rare and usually occurs if also on a lot of insulin.

## 2025-02-11 NOTE — PROGRESS NOTES
Subjective  Niya Guerrero is a 49 y.o. female with a hx of T2DM, HLD, GERD, eczema, s/p knee surgeries bilaterally, MELANI who presents for weight management and obesity.    Virtual:  Current Plan  1. Nutrition: Is now back on track.      2. Sleep: Stable      3. Stress: Stable     4. Exercise:  started walking again       5. Appetite control: Stable  Obesity medication: Mounjaro- 15mg weekly      6. Prior Goals: Met     New Goals: Other- stay active and distraction     Weight trend:    Wt Readings from Last 10 Encounters:   01/15/25 79.4 kg (175 lb)   11/06/24 77.6 kg (171 lb)   10/10/24 77.6 kg (171 lb)   10/02/24 77.6 kg (171 lb)   09/03/24 78.9 kg (174 lb)   08/20/24 77.6 kg (171 lb)   07/16/24 76.7 kg (169 lb)   06/11/24 77.1 kg (170 lb)   05/08/24 76.7 kg (169 lb)   04/10/24 76.7 kg (169 lb)         Current Outpatient Medications:     atorvastatin (Lipitor) 20 mg tablet, TAKE 1 TABLET BY MOUTH DAILY AS DIRECTED, Disp: 90 tablet, Rfl: 3    ergocalciferol (Vitamin D-2) 1.25 MG (90316 UT) capsule, Take 1 capsule (50,000 Units) by mouth 1 (one) time per week., Disp: , Rfl:     montelukast (Singulair) 10 mg tablet, Take 1 tablet (10 mg) by mouth once daily., Disp: 90 tablet, Rfl: 3    multivitamin tablet, Take 1 tablet by mouth once daily., Disp: , Rfl:     naproxen sodium (Aleve) 220 mg capsule, Take 220 mg by mouth every 12 hours if needed (joint pain)., Disp: , Rfl:     tirzepatide (Mounjaro) 12.5 mg/0.5 mL pen injector, Inject 12.5 mg under the skin every 7 days., Disp: 6 mL, Rfl: 2    tirzepatide (Mounjaro) 15 mg/0.5 mL pen injector, Inject 15 mg under the skin every 7 days., Disp: 2 mL, Rfl: 2    ROS:  System: normal  Eyes : no visual changes  Neck : no tenderness, no new lumps/bumps  Respiratory : no SOB  Cardiovascular : no chest pain, no palpitations  Gastro-Intestinal : no abdominal concerns  Neurological : no numbness or tingling in the extremities  Musculoskeletal : no joint paint, no muscle pain  Skin : no  unusual rashes  Psychiatric : no depression, no anxiety  See HPI for Endocrine ROS    Past Medical History:   Diagnosis Date    Obstructive sleep apnea (adult) (pediatric) 02/23/2017    Obstructive sleep apnea    Personal history of diseases of the skin and subcutaneous tissue 02/04/2015    History of urticaria    Personal history of other diseases of the musculoskeletal system and connective tissue 02/04/2015    History of arthritis    Personal history of other endocrine, nutritional and metabolic disease 01/22/2015    History of hypokalemia    Sprain of medial collateral ligament of unspecified knee, initial encounter 04/22/2014    Tear of MCL (medial collateral ligament) of knee    Unspecified tear of unspecified meniscus, current injury, unspecified knee, initial encounter 04/22/2014    Meniscus tear       Past Surgical History:   Procedure Laterality Date    HYSTEROSCOPY  01/22/2015    Hysteroscopy With Endometrial Ablation    KNEE ARTHROSCOPY W/ DEBRIDEMENT  04/22/2014    Knee Arthroscopy (Therapeutic)       Social History     Socioeconomic History    Marital status:      Spouse name: Not on file    Number of children: Not on file    Years of education: Not on file    Highest education level: Not on file   Occupational History    Not on file   Tobacco Use    Smoking status: Never    Smokeless tobacco: Never   Vaping Use    Vaping status: Never Used   Substance and Sexual Activity    Alcohol use: Not Currently    Drug use: Never    Sexual activity: Not on file   Other Topics Concern    Not on file   Social History Narrative    Not on file     Social Drivers of Health     Financial Resource Strain: Not on file   Food Insecurity: Not on file   Transportation Needs: Not on file   Physical Activity: Not on file   Stress: Not on file   Social Connections: Not on file   Intimate Partner Violence: Not on file   Housing Stability: Not on file       Objective      Physical Exam:  There were no vitals taken for  this visit.  General : alert and oriented X3, no acute distress  Eyes : EOMI   BMI: 29.45kg/m2    Assessment/Plan  Niya Guerrero is a 49 y.o. female with a hx of T2DM, HLD, GERD, eczema, s/p knee surgeries bilaterally, MELANI who presents for weight management and obesity.     1. Weight Management : Reviewed the principles of energy metabolism, caloric intake and expenditure, and rationale for a treatment program.  Also reinforced need for reduced calorie, low fat diet and increased physical activity.     We reviewed the possibility of starting an interdisciplinary lifestyle intervention program involving improvement of the diet, a personalized exercise program, efforts to reduce the stress and the possibility of using appetite suppressant medications in an effort to help with the weight loss process.  The patient expressed interest in the plan.     2. Nutrition : I discussed trying one of the diet approaches we have here in the program : Mediterranean lifestyle, ketosis diet.     3/29/23 : 199lb, 33.16kg/m2  5/9/23 : 193lb, 32.2kg/m2  6/14/23 : 192lb, 31.97kg/m2  7/26/23: 182.8lb, 30.37kg/m2  9/27/23: 176.6lb, 29.35kg/m2  1/9/24: 171.8lb, 28.59kg/m2  4/10/24: 169lb, 28.12kg/m2  5/8/24: 169lb, 28.12kg/m2  6/11/24: 170lb, 28.29kg/m2  7/16/24: 169.0 lb, 76.7 kg/m2   8/20/24: 171lb,  30.29kg/m2   10/02/24: 171lb, 28.46kg/m2   11/06/24: 171lb, 28.46kg/m2   1/15/25: 175lb, 29.12kg/m2   2/12/25: 177lb, 29.45kg/m2     3. Sleep : is fine.     4. Stress : struggling with the loss of her Mother a few months ago.     5. Exercise : minimal.     6. Appetite : stable.     7. DM : Managed by Dr. Shipley.  switched from ozempic 2mg/wk to Mounjaro, now on 15mg/wk     8. Goal : to be more active, also with her puppy.     Follow up in a group visit.  Philip Peres MD

## 2025-02-12 ENCOUNTER — APPOINTMENT (OUTPATIENT)
Dept: ENDOCRINOLOGY | Facility: CLINIC | Age: 50
End: 2025-02-12
Payer: COMMERCIAL

## 2025-02-12 VITALS — BODY MASS INDEX: 29.49 KG/M2 | WEIGHT: 177 LBS | HEIGHT: 65 IN

## 2025-02-12 DIAGNOSIS — Z76.89 ENCOUNTER FOR WEIGHT MANAGEMENT: ICD-10-CM

## 2025-02-12 DIAGNOSIS — E66.3 OVERWEIGHT: Primary | ICD-10-CM

## 2025-02-12 PROCEDURE — 3008F BODY MASS INDEX DOCD: CPT | Performed by: INTERNAL MEDICINE

## 2025-02-12 PROCEDURE — 99215 OFFICE O/P EST HI 40 MIN: CPT | Performed by: INTERNAL MEDICINE

## 2025-02-17 ENCOUNTER — APPOINTMENT (OUTPATIENT)
Dept: ALLERGY | Facility: CLINIC | Age: 50
End: 2025-02-17
Payer: COMMERCIAL

## 2025-02-24 ENCOUNTER — APPOINTMENT (OUTPATIENT)
Dept: ALLERGY | Facility: CLINIC | Age: 50
End: 2025-02-24
Payer: COMMERCIAL

## 2025-02-27 PROCEDURE — RXMED WILLOW AMBULATORY MEDICATION CHARGE

## 2025-03-04 ENCOUNTER — PHARMACY VISIT (OUTPATIENT)
Dept: PHARMACY | Facility: CLINIC | Age: 50
End: 2025-03-04
Payer: COMMERCIAL

## 2025-03-04 ENCOUNTER — APPOINTMENT (OUTPATIENT)
Dept: ALLERGY | Facility: CLINIC | Age: 50
End: 2025-03-04
Payer: COMMERCIAL

## 2025-03-04 DIAGNOSIS — J30.2 SEASONAL ALLERGIES: ICD-10-CM

## 2025-03-04 PROCEDURE — 95117 IMMUNOTHERAPY INJECTIONS: CPT | Performed by: ALLERGY & IMMUNOLOGY

## 2025-03-11 ENCOUNTER — APPOINTMENT (OUTPATIENT)
Dept: ALLERGY | Facility: CLINIC | Age: 50
End: 2025-03-11
Payer: COMMERCIAL

## 2025-03-11 DIAGNOSIS — J30.2 SEASONAL ALLERGIES: ICD-10-CM

## 2025-03-11 PROCEDURE — 95117 IMMUNOTHERAPY INJECTIONS: CPT | Performed by: ALLERGY & IMMUNOLOGY

## 2025-03-18 ENCOUNTER — APPOINTMENT (OUTPATIENT)
Dept: ALLERGY | Facility: CLINIC | Age: 50
End: 2025-03-18
Payer: COMMERCIAL

## 2025-03-18 DIAGNOSIS — J30.1 ALLERGIC RHINITIS DUE TO POLLEN, UNSPECIFIED SEASONALITY: ICD-10-CM

## 2025-03-18 PROCEDURE — 95117 IMMUNOTHERAPY INJECTIONS: CPT | Performed by: ALLERGY & IMMUNOLOGY

## 2025-03-25 ENCOUNTER — APPOINTMENT (OUTPATIENT)
Dept: ALLERGY | Facility: CLINIC | Age: 50
End: 2025-03-25
Payer: COMMERCIAL

## 2025-03-25 DIAGNOSIS — J30.2 SEASONAL ALLERGIES: ICD-10-CM

## 2025-03-25 PROCEDURE — 95117 IMMUNOTHERAPY INJECTIONS: CPT | Performed by: ALLERGY & IMMUNOLOGY

## 2025-03-27 PROCEDURE — RXMED WILLOW AMBULATORY MEDICATION CHARGE

## 2025-04-01 ENCOUNTER — PHARMACY VISIT (OUTPATIENT)
Dept: PHARMACY | Facility: CLINIC | Age: 50
End: 2025-04-01
Payer: COMMERCIAL

## 2025-04-01 ENCOUNTER — APPOINTMENT (OUTPATIENT)
Dept: ALLERGY | Facility: CLINIC | Age: 50
End: 2025-04-01
Payer: COMMERCIAL

## 2025-04-02 ENCOUNTER — OFFICE VISIT (OUTPATIENT)
Dept: ORTHOPEDIC SURGERY | Facility: HOSPITAL | Age: 50
End: 2025-04-02
Payer: COMMERCIAL

## 2025-04-02 ENCOUNTER — SPECIALTY PHARMACY (OUTPATIENT)
Dept: PHARMACY | Facility: CLINIC | Age: 50
End: 2025-04-02

## 2025-04-02 VITALS — WEIGHT: 172 LBS | BODY MASS INDEX: 28.66 KG/M2 | HEIGHT: 65 IN

## 2025-04-02 DIAGNOSIS — M17.0 BILATERAL PRIMARY OSTEOARTHRITIS OF KNEE: Primary | ICD-10-CM

## 2025-04-02 PROCEDURE — 2500000004 HC RX 250 GENERAL PHARMACY W/ HCPCS (ALT 636 FOR OP/ED): Performed by: PHYSICIAN ASSISTANT

## 2025-04-02 PROCEDURE — 3008F BODY MASS INDEX DOCD: CPT | Performed by: PHYSICIAN ASSISTANT

## 2025-04-02 PROCEDURE — 20610 DRAIN/INJ JOINT/BURSA W/O US: CPT | Mod: 50 | Performed by: PHYSICIAN ASSISTANT

## 2025-04-02 PROCEDURE — 1036F TOBACCO NON-USER: CPT | Performed by: PHYSICIAN ASSISTANT

## 2025-04-02 RX ORDER — TRIAMCINOLONE ACETONIDE 40 MG/ML
40 INJECTION, SUSPENSION INTRA-ARTICULAR; INTRAMUSCULAR
Status: COMPLETED | OUTPATIENT
Start: 2025-04-02 | End: 2025-04-02

## 2025-04-02 RX ORDER — HYALURONATE SODIUM, STABILIZED 60 MG/3 ML
60 SYRINGE (ML) INTRAARTICULAR ONCE
Qty: 6 ML | Refills: 0 | Status: SHIPPED | OUTPATIENT
Start: 2025-04-02 | End: 2025-04-03

## 2025-04-02 RX ADMIN — TRIAMCINOLONE ACETONIDE 40 MG: 400 INJECTION, SUSPENSION INTRA-ARTICULAR; INTRAMUSCULAR at 08:57

## 2025-04-02 ASSESSMENT — PAIN - FUNCTIONAL ASSESSMENT: PAIN_FUNCTIONAL_ASSESSMENT: 0-10

## 2025-04-02 ASSESSMENT — PAIN SCALES - GENERAL: PAINLEVEL_OUTOF10: 9

## 2025-04-02 NOTE — PROGRESS NOTES
Subjective    Patient ID: Niya Guerrero is a 49 y.o. female.    Chief Complaint: Injections and Follow-up of the Left Knee and Injections and Follow-up of the Right Knee           HPI:  Niya Guerrero is a 49 y.o. female with known history of osteoarthritis of both knees.  She presents today requesting repeat intra-articular steroid injection in each knee.    ROS  Constitutional: No fever, no chills, not feeling tired, no recent weight gain and no recent weight loss  ENT: No nosebleeds  Cardiovascular: No chest pain  Respiratory: No shortness of breath and no cough  Gastrointestinal: No abdominal pain, no nausea, no diarrhea, and no vomiting  Musculoskeletal: No arthralgias  Integumentary: No rashes and no skin lesions  Neurological: No headache  Psychiatric: No sleep disturbances no depression  Endocrine: No muscle weakness and no muscle cramps  Hematologic/lymphatic: No swelling glands and no tendency for easy bruising    Past Medical History:   Diagnosis Date    Obstructive sleep apnea (adult) (pediatric) 02/23/2017    Obstructive sleep apnea    Personal history of diseases of the skin and subcutaneous tissue 02/04/2015    History of urticaria    Personal history of other diseases of the musculoskeletal system and connective tissue 02/04/2015    History of arthritis    Personal history of other endocrine, nutritional and metabolic disease 01/22/2015    History of hypokalemia    Sprain of medial collateral ligament of unspecified knee, initial encounter 04/22/2014    Tear of MCL (medial collateral ligament) of knee    Unspecified tear of unspecified meniscus, current injury, unspecified knee, initial encounter 04/22/2014    Meniscus tear        Past Surgical History:   Procedure Laterality Date    HYSTEROSCOPY  01/22/2015    Hysteroscopy With Endometrial Ablation    KNEE ARTHROSCOPY W/ DEBRIDEMENT  04/22/2014    Knee Arthroscopy (Therapeutic)          Current Outpatient Medications:     atorvastatin (Lipitor) 20 mg  tablet, TAKE 1 TABLET BY MOUTH DAILY AS DIRECTED, Disp: 90 tablet, Rfl: 3    ergocalciferol (Vitamin D-2) 1.25 MG (12187 UT) capsule, Take 1 capsule (50,000 Units) by mouth 1 (one) time per week., Disp: , Rfl:     montelukast (Singulair) 10 mg tablet, Take 1 tablet (10 mg) by mouth once daily., Disp: 90 tablet, Rfl: 3    multivitamin tablet, Take 1 tablet by mouth once daily., Disp: , Rfl:     naproxen sodium (Aleve) 220 mg capsule, Take 220 mg by mouth every 12 hours if needed (joint pain)., Disp: , Rfl:     tirzepatide (Mounjaro) 15 mg/0.5 mL pen injector, Inject 15 mg under the skin every 7 days., Disp: 2 mL, Rfl: 2     No Known Allergies     Social Connections: Not on file          Objective   49-year-old female well appearing in no acute distress. Alert and oriented ×3.  Skin intact bilateral lower extremities.   Normal tandem gait. Coordination and balance intact.  Bilateral lower extremity compartments supple.  2+ DP/PT pulses bilaterally.  Injection site both knees is clear    L Inj/Asp: bilateral knee on 4/2/2025 8:57 AM  Indications: pain  Details: 22 G needle, superolateral approach  Medications (Right): 40 mg triamcinolone acetonide 40 mg/mL  Medications (Left): 40 mg triamcinolone acetonide 40 mg/mL      Knee injection:    Right knee intra-articular injection is offered for therapeutic purposes.  Indication is knee pain.  Risks and benefits of the injection were discussed.  After questions were answered, consent was provided to proceed.  Under sterile conditions, the knee was injected through a superolateral patellar site with 40 mg Kenalog and 5 cc lidocaine without complication.  The patient tolerated the injection well.  A dressing was applied.  Post injection instructions were given.    Left knee intra-articular injection is offered for therapeutic purposes.  Indication is knee pain.  Risks and benefits of the injection were discussed.  After questions were answered, consent was provided to proceed.   Under sterile conditions, the knee was injected through a superolateral patellar site with 40 mg Kenalog and 5 cc lidocaine without complication.  The patient tolerated the injection well.  A dressing was applied.  Post injection instructions were given.    Assessment/Plan   Encounter Diagnoses:  Bilateral primary osteoarthritis of knee    No orders of the defined types were placed in this encounter.      Hopefully she does well with today's injections.  In the past gel injections have not been covered well by her insurance.  We are going to try to see if it has improved.  She is starting to see diminishing returns on steroid injections of viscosupplementation to be the next reasonable step for her.  She verbalized understanding and agreed with this plan.  This office note was dictated using Dragon voice to text software and was not proofread for spelling or grammatical errors

## 2025-04-08 ENCOUNTER — APPOINTMENT (OUTPATIENT)
Dept: ALLERGY | Facility: CLINIC | Age: 50
End: 2025-04-08
Payer: COMMERCIAL

## 2025-04-08 ENCOUNTER — OFFICE VISIT (OUTPATIENT)
Dept: ENDOCRINOLOGY | Facility: CLINIC | Age: 50
End: 2025-04-08
Payer: COMMERCIAL

## 2025-04-08 VITALS — HEIGHT: 65 IN | BODY MASS INDEX: 29.16 KG/M2 | WEIGHT: 175 LBS

## 2025-04-08 DIAGNOSIS — E66.3 OVERWEIGHT: Primary | ICD-10-CM

## 2025-04-08 DIAGNOSIS — Z76.89 ENCOUNTER FOR WEIGHT MANAGEMENT: ICD-10-CM

## 2025-04-08 PROCEDURE — 99215 OFFICE O/P EST HI 40 MIN: CPT | Performed by: INTERNAL MEDICINE

## 2025-04-08 PROCEDURE — 3008F BODY MASS INDEX DOCD: CPT | Performed by: INTERNAL MEDICINE

## 2025-04-08 NOTE — PROGRESS NOTES
Subjective  Niya Guerrero is a 49 y.o. female with a hx of T2DM, HLD, GERD, eczema, s/p knee surgeries bilaterally, MELANI who presents for weight management and obesity.    Virtual:  Current Plan  1. Nutrition: still trying to try new foods.      2. Sleep: Stable      3. Stress: Stable     4. Exercise: walking. Started strength training.       5. Appetite control: Stable  Obesity medication: Mounjaro- 15mg weekly      6. Prior Goals: Met     New Goals: increase strength training. Stay consistent with trying new foods.     Weight trend:    Wt Readings from Last 10 Encounters:   04/02/25 78 kg (172 lb)   02/12/25 80.3 kg (177 lb)   01/15/25 79.4 kg (175 lb)   11/06/24 77.6 kg (171 lb)   10/10/24 77.6 kg (171 lb)   10/02/24 77.6 kg (171 lb)   09/03/24 78.9 kg (174 lb)   08/20/24 77.6 kg (171 lb)   07/16/24 76.7 kg (169 lb)   06/11/24 77.1 kg (170 lb)         Current Outpatient Medications:     atorvastatin (Lipitor) 20 mg tablet, TAKE 1 TABLET BY MOUTH DAILY AS DIRECTED, Disp: 90 tablet, Rfl: 3    ergocalciferol (Vitamin D-2) 1.25 MG (18922 UT) capsule, Take 1 capsule (50,000 Units) by mouth 1 (one) time per week., Disp: , Rfl:     montelukast (Singulair) 10 mg tablet, Take 1 tablet (10 mg) by mouth once daily., Disp: 90 tablet, Rfl: 3    multivitamin tablet, Take 1 tablet by mouth once daily., Disp: , Rfl:     naproxen sodium (Aleve) 220 mg capsule, Take 220 mg by mouth every 12 hours if needed (joint pain)., Disp: , Rfl:     tirzepatide (Mounjaro) 15 mg/0.5 mL pen injector, Inject 15 mg under the skin every 7 days., Disp: 2 mL, Rfl: 2    ROS:  System: normal  Eyes : no visual changes  Neck : no tenderness, no new lumps/bumps  Respiratory : no SOB  Cardiovascular : no chest pain, no palpitations  Gastro-Intestinal : no abdominal concerns  Neurological : no numbness or tingling in the extremities  Musculoskeletal : no joint paint, no muscle pain  Skin : no unusual rashes  Psychiatric : no depression, no anxiety  See HPI  for Endocrine ROS    Past Medical History:   Diagnosis Date    Obstructive sleep apnea (adult) (pediatric) 02/23/2017    Obstructive sleep apnea    Personal history of diseases of the skin and subcutaneous tissue 02/04/2015    History of urticaria    Personal history of other diseases of the musculoskeletal system and connective tissue 02/04/2015    History of arthritis    Personal history of other endocrine, nutritional and metabolic disease 01/22/2015    History of hypokalemia    Sprain of medial collateral ligament of unspecified knee, initial encounter 04/22/2014    Tear of MCL (medial collateral ligament) of knee    Unspecified tear of unspecified meniscus, current injury, unspecified knee, initial encounter 04/22/2014    Meniscus tear       Past Surgical History:   Procedure Laterality Date    HYSTEROSCOPY  01/22/2015    Hysteroscopy With Endometrial Ablation    KNEE ARTHROSCOPY W/ DEBRIDEMENT  04/22/2014    Knee Arthroscopy (Therapeutic)       Social History     Socioeconomic History    Marital status:      Spouse name: Not on file    Number of children: Not on file    Years of education: Not on file    Highest education level: Not on file   Occupational History    Not on file   Tobacco Use    Smoking status: Never    Smokeless tobacco: Never   Vaping Use    Vaping status: Never Used   Substance and Sexual Activity    Alcohol use: Not Currently    Drug use: Never    Sexual activity: Not on file   Other Topics Concern    Not on file   Social History Narrative    Not on file     Social Drivers of Health     Financial Resource Strain: Not on file   Food Insecurity: Not on file   Transportation Needs: Not on file   Physical Activity: Not on file   Stress: Not on file   Social Connections: Not on file   Intimate Partner Violence: Not on file   Housing Stability: Not on file       Objective      Physical Exam:  There were no vitals taken for this visit.  General : alert and oriented X3, no acute  distress  Eyes : EOMI   BMI: 29.12kg/m2    Assessment/Plan  Niya Guerrero is a 49 y.o. female with a hx of T2DM, HLD, GERD, eczema, s/p knee surgeries bilaterally, MELANI who presents for weight management and obesity.     1. Weight Management : Reviewed the principles of energy metabolism, caloric intake and expenditure, and rationale for a treatment program.  Also reinforced need for reduced calorie, low fat diet and increased physical activity.     We reviewed the possibility of starting an interdisciplinary lifestyle intervention program involving improvement of the diet, a personalized exercise program, efforts to reduce the stress and the possibility of using appetite suppressant medications in an effort to help with the weight loss process.  The patient expressed interest in the plan.     2. Nutrition : I discussed trying one of the diet approaches we have here in the program : Mediterranean lifestyle, ketosis diet.     3/29/23 : 199lb, 33.16kg/m2  5/9/23 : 193lb, 32.2kg/m2  6/14/23 : 192lb, 31.97kg/m2  7/26/23: 182.8lb, 30.37kg/m2  9/27/23: 176.6lb, 29.35kg/m2  1/9/24: 171.8lb, 28.59kg/m2  4/10/24: 169lb, 28.12kg/m2  5/8/24: 169lb, 28.12kg/m2  6/11/24: 170lb, 28.29kg/m2  7/16/24: 169.0 lb, 76.7 kg/m2   8/20/24: 171lb,  30.29kg/m2   10/02/24: 171lb, 28.46kg/m2   11/06/24: 171lb, 28.46kg/m2   1/15/25: 175lb, 29.12kg/m2   2/12/25: 177lb, 29.45kg/m2  4/8/25:  175lb, 29.12kg/m2     3. Sleep : is fine.     4. Stress : struggling with the loss of her Mother a few months ago.     5. Exercise : minimal.     6. Appetite : stable.     7. DM : Managed by Dr. Shipley.  switched from ozempic 2mg/wk to Mounjaro, now on 15mg/wk     8. Goal : walking her Great Jalen puppy, wants to add ST.     Follow up in a group visit.  Revbárbara Perse MD

## 2025-04-15 ENCOUNTER — APPOINTMENT (OUTPATIENT)
Dept: ALLERGY | Facility: CLINIC | Age: 50
End: 2025-04-15
Payer: COMMERCIAL

## 2025-04-22 ENCOUNTER — APPOINTMENT (OUTPATIENT)
Dept: ALLERGY | Facility: CLINIC | Age: 50
End: 2025-04-22
Payer: COMMERCIAL

## 2025-04-29 ENCOUNTER — APPOINTMENT (OUTPATIENT)
Dept: ALLERGY | Facility: CLINIC | Age: 50
End: 2025-04-29
Payer: COMMERCIAL

## 2025-04-29 DIAGNOSIS — E11.9 TYPE 2 DIABETES MELLITUS WITHOUT COMPLICATION, WITHOUT LONG-TERM CURRENT USE OF INSULIN: ICD-10-CM

## 2025-04-29 PROCEDURE — RXMED WILLOW AMBULATORY MEDICATION CHARGE

## 2025-04-30 RX ORDER — TIRZEPATIDE 15 MG/.5ML
15 INJECTION, SOLUTION SUBCUTANEOUS
Qty: 2 ML | Refills: 2 | Status: SHIPPED | OUTPATIENT
Start: 2025-04-30

## 2025-05-01 PROCEDURE — RXMED WILLOW AMBULATORY MEDICATION CHARGE

## 2025-05-02 ENCOUNTER — PHARMACY VISIT (OUTPATIENT)
Dept: PHARMACY | Facility: CLINIC | Age: 50
End: 2025-05-02
Payer: COMMERCIAL

## 2025-05-06 ENCOUNTER — APPOINTMENT (OUTPATIENT)
Dept: ALLERGY | Facility: CLINIC | Age: 50
End: 2025-05-06
Payer: COMMERCIAL

## 2025-05-13 ENCOUNTER — APPOINTMENT (OUTPATIENT)
Dept: ALLERGY | Facility: CLINIC | Age: 50
End: 2025-05-13
Payer: COMMERCIAL

## 2025-05-20 ENCOUNTER — APPOINTMENT (OUTPATIENT)
Dept: ALLERGY | Facility: CLINIC | Age: 50
End: 2025-05-20
Payer: COMMERCIAL

## 2025-05-20 DIAGNOSIS — M17.0 BILATERAL PRIMARY OSTEOARTHRITIS OF KNEE: Primary | ICD-10-CM

## 2025-05-20 RX ORDER — HYALURONATE SODIUM, STABILIZED 60 MG/3 ML
60 SYRINGE (ML) INTRAARTICULAR ONCE
Qty: 6 ML | Refills: 0 | Status: SHIPPED | OUTPATIENT
Start: 2025-05-20 | End: 2025-05-29

## 2025-05-27 ENCOUNTER — APPOINTMENT (OUTPATIENT)
Dept: ALLERGY | Facility: CLINIC | Age: 50
End: 2025-05-27
Payer: COMMERCIAL

## 2025-05-28 ENCOUNTER — PHARMACY VISIT (OUTPATIENT)
Dept: PHARMACY | Facility: CLINIC | Age: 50
End: 2025-05-28
Payer: COMMERCIAL

## 2025-05-28 PROCEDURE — RXMED WILLOW AMBULATORY MEDICATION CHARGE

## 2025-06-02 ENCOUNTER — HOSPITAL ENCOUNTER (OUTPATIENT)
Dept: RADIOLOGY | Facility: CLINIC | Age: 50
Discharge: HOME | End: 2025-06-02
Payer: COMMERCIAL

## 2025-06-02 ENCOUNTER — APPOINTMENT (OUTPATIENT)
Dept: ORTHOPEDIC SURGERY | Facility: CLINIC | Age: 50
End: 2025-06-02
Payer: COMMERCIAL

## 2025-06-02 DIAGNOSIS — M17.12 OSTEOARTHRITIS OF LEFT KNEE, UNSPECIFIED OSTEOARTHRITIS TYPE: Primary | ICD-10-CM

## 2025-06-02 DIAGNOSIS — M17.0 BILATERAL PRIMARY OSTEOARTHRITIS OF KNEE: ICD-10-CM

## 2025-06-02 PROCEDURE — 73564 X-RAY EXAM KNEE 4 OR MORE: CPT | Mod: 50

## 2025-06-02 NOTE — PROGRESS NOTES
Department of Orthopaedic Surgery  Division of Adult Reconstruction    Chief Complaint: Left knee pain    HPI:  Niya Guerrero is a pleasant 49 y.o. year-old female who is seen today for evaluation of left knee pain.  Niya complains of pain in the left knee for the past several years.  She previously underwent bilateral arthroscopic medial meniscectomy.  Over the last few months her pain has become quite debilitating, and she is unable to tolerate walking community distances.  Traversing stairs and uneven surfaces is particularly aggravating.  She has tried multiple corticosteroid injections, a series of hyaluronic acid injections, activity modification, and exercise with inadequate relief.  At this juncture she is interested in surgery.    Niya enjoys walking her new great Jalen puppy.  She has a son that lives locally.  She works at  as a supervisor for heart/vascular scheduling.      Review of Symptoms:  The patient denies any fever, chills, chest pain, shortness of breath or difficulty breathing.  Patient denies any numbness, tingling, or radicular symptoms.      Adult patient history sheet was filled out by the patient today in clinic and will be scanned into the EMR.  I personally reviewed this form which will be scanned into the EMR.  This includes Past Medical History, Past Surgical History, Medications, Allergies, Social History, Family History and 12 point review of systems.    Past Medical History:    Medical History[1]    Past Surgical History:    Surgical History[2]    Allergies:    Allergies[3]    Medications:    Medications Ordered Prior to Encounter[4]    Social History:    Social History     Occupational History    Not on file   Tobacco Use    Smoking status: Never    Smokeless tobacco: Never   Vaping Use    Vaping status: Never Used   Substance and Sexual Activity    Alcohol use: Not Currently    Drug use: Never    Sexual activity: Not on file     Family History:  Noncontributory    Exam:  General:  Well-appearing female in no acute distress.  Awake, alert and oriented.  Pleasant and cooperative.  Respiratory: Non-labored breathing  Mood: Euthymic   Gait: Antalgic  Assistive Device: None     Affected Left Knee  Limb Alignment: Varus  ROM: 0-120  Stable to varus and valgus stress at full extension and 30 degrees of flexion  Skin: Intact, no abrasions or draining sinuses  Effusion: None  Quad Strength: 5/5  Hamstring Strength: 5/5  Patella Crepitus: None  Patella Grind: Negative  Tenderness: Medial joint line  Sensation: Intact to light touch distally  Motor function: Able to fire TA, EHL, G/S  Pulses: Palpable DP pulse    Imaging interpretation:   X-rays of the left knee demonstrate osteoarthritis with varus limb alignment.  There is joint space obliteration, subchondral sclerosis, and osteophytes.  Lateral subluxation of the tibia relative to the femur.    Assessment and Plan:  49-year-old female with debilitating left knee pain secondary to osteoarthritis refractory to multiple nonoperative measures.  We discussed the continuum of care including operative and nonoperative treatments.  At this juncture she has tried and failed several nonoperative modalities and wishes to have surgery.  She is thinking about scheduling this fall.    We discussed unicompartmental knee arthroplasty versus total knee arthroplasty, but given the extent of her lateral and patellofemoral compartment osteophytes, lateral joint pain, and lateral subluxation of the tibia relative to the femur, we agreed on total knee arthroplasty.    Niya Guerrero for more than six months has had limited function as well as persistent and severe pain which has negatively impacted the quality of life and interfered with activities of daily living. Under my care or the care of other providers, for greater than the three months, conservative treatment including activity modification, over the counter pain medications, injections, physical therapy and/or  recommended home exercise program, have provided only minimal relief. The patient has not had an intra-articular injection in the past 3 months. The option to continue with conservative measures in lieu of arthroplasty was discussed and offered. However, given the failure of these conservative measures and the clinical and radiographic evidence of end-stage arthritis, the patient is a good candidate for an elective total knee arthroplasty. The stated potential benefits include pain relief and improved function were discussed but no guarantees were offered. Some patients may experience improved range of motion but pre-operative range of motion remains the strongest predictor of post-operative range of motion.      We discussed risks, limitations, benefits and alternatives to left total knee arthroplasty today. I reviewed risks and concerns including but not limited to implant wear, loosening, implant failure, joint dislocation, infection, need for revision surgery, delayed wound healing, deep vein thrombosis, pulmonary embolism, stroke, other cardiopulmonary event, nerve or vascular injury, death and other medical and anesthetic complications of surgery. We talked about the potential for persistent pain following surgery since there are many possible causes for periarticular pain. The patient was advised that joint replacement will only relieve pain that is coming from the joint. We talked about leg length discrepancy that may necessitate the use of a shoe lift, neurovascular problems such as leg weakness or numbness after surgery. I reviewed activity restrictions in detail. We discussed the concerns about intraoperative fracture, ingrowth failure if applicable, anterior knee pain and numbness, and possible post-operative weight bearing/motionrestrictions.  We discussed the possible need for a blood transfusion. We discussed the fact that many of our patients are able to go home in 1 day or the same day depending on  their health, mobility, pre-op preparation, individual home situation and personal preference. The patient should take our pre-operative teaching class. All of the patients questions were answered. The patient can call my office to schedule surgery and the pre-op teaching class. I told the patient that they should contact their primary care physician to discuss fitness for surgery.     The patient acknowledged a clear understanding of these issues and expressed the desire to proceed with surgery once medical clearance has been obtained.     Surgical plan: Left total knee arthroplasty  Surgery location: [facility]  Implants: DePuy CartiCure  Equipment: None  Anesthesia: Spinal  DVT prophylaxis: Aspirin 81 mg BID for 4 weeks   Drugs to stop: Mounjaro in accordance with anesthesia recommendations  Allergies to antibiotics: None (normal PCN tolerance test recently)  Antibiotic Plan: Ancef +/- vancomycin  Disposition: Observation    Juaquin Wheat MD  Department of Orthopaedic Surgery  Division of Adult Reconstruction  , [facility]                [1]   Past Medical History:  Diagnosis Date    Obstructive sleep apnea (adult) (pediatric) 02/23/2017    Obstructive sleep apnea    Personal history of diseases of the skin and subcutaneous tissue 02/04/2015    History of urticaria    Personal history of other diseases of the musculoskeletal system and connective tissue 02/04/2015    History of arthritis    Personal history of other endocrine, nutritional and metabolic disease 01/22/2015    History of hypokalemia    Sprain of medial collateral ligament of unspecified knee, initial encounter 04/22/2014    Tear of MCL (medial collateral ligament) of knee    Unspecified tear of unspecified meniscus, current injury, unspecified knee, initial encounter 04/22/2014    Meniscus tear   [2]   Past Surgical History:  Procedure Laterality Date    HYSTEROSCOPY  01/22/2015    Hysteroscopy With Endometrial  Ablation    KNEE ARTHROSCOPY W/ DEBRIDEMENT  2014    Knee Arthroscopy (Therapeutic)   [3] No Known Allergies  [4]   Current Outpatient Medications on File Prior to Visit   Medication Sig Dispense Refill    atorvastatin (Lipitor) 20 mg tablet TAKE 1 TABLET BY MOUTH DAILY AS DIRECTED 90 tablet 3    ergocalciferol (Vitamin D-2) 1.25 MG (60377 UT) capsule Take 1 capsule (50,000 Units) by mouth 1 (one) time per week.      montelukast (Singulair) 10 mg tablet Take 1 tablet (10 mg) by mouth once daily. 90 tablet 3    multivitamin tablet Take 1 tablet by mouth once daily.      naproxen sodium (Aleve) 220 mg capsule Take 220 mg by mouth every 12 hours if needed (joint pain).      [] sodium hyaluronate (Durolane) 60 mg/3 mL injection Inject 3 mL (60 mg) into the joint 1 time for 1 dose. Inject intra-articularly one time 6 mL 0    tirzepatide (Mounjaro) 15 mg/0.5 mL pen injector Inject 15 mg under the skin every 7 days. 2 mL 2     No current facility-administered medications on file prior to visit.

## 2025-06-20 DIAGNOSIS — E78.5 HYPERLIPIDEMIA, UNSPECIFIED HYPERLIPIDEMIA TYPE: ICD-10-CM

## 2025-06-20 PROCEDURE — RXMED WILLOW AMBULATORY MEDICATION CHARGE

## 2025-06-20 RX ORDER — ATORVASTATIN CALCIUM 20 MG/1
20 TABLET, FILM COATED ORAL DAILY
Qty: 90 TABLET | Refills: 3 | Status: SHIPPED | OUTPATIENT
Start: 2025-06-20 | End: 2026-06-19

## 2025-06-25 ENCOUNTER — PHARMACY VISIT (OUTPATIENT)
Dept: PHARMACY | Facility: CLINIC | Age: 50
End: 2025-06-25
Payer: COMMERCIAL

## 2025-06-30 PROBLEM — M17.12 OSTEOARTHRITIS OF LEFT KNEE: Status: ACTIVE | Noted: 2025-06-02

## 2025-07-09 ENCOUNTER — OFFICE VISIT (OUTPATIENT)
Dept: ORTHOPEDIC SURGERY | Facility: CLINIC | Age: 50
End: 2025-07-09
Payer: COMMERCIAL

## 2025-07-09 DIAGNOSIS — M17.0 BILATERAL PRIMARY OSTEOARTHRITIS OF KNEE: Primary | ICD-10-CM

## 2025-07-09 PROCEDURE — 1036F TOBACCO NON-USER: CPT | Performed by: PHYSICIAN ASSISTANT

## 2025-07-09 PROCEDURE — 2500000004 HC RX 250 GENERAL PHARMACY W/ HCPCS (ALT 636 FOR OP/ED): Performed by: PHYSICIAN ASSISTANT

## 2025-07-09 PROCEDURE — 20610 DRAIN/INJ JOINT/BURSA W/O US: CPT | Mod: 50 | Performed by: PHYSICIAN ASSISTANT

## 2025-07-09 RX ORDER — TRIAMCINOLONE ACETONIDE 40 MG/ML
40 INJECTION, SUSPENSION INTRA-ARTICULAR; INTRAMUSCULAR
Status: COMPLETED | OUTPATIENT
Start: 2025-07-09 | End: 2025-07-09

## 2025-07-09 RX ADMIN — TRIAMCINOLONE ACETONIDE 40 MG: 400 INJECTION, SUSPENSION INTRA-ARTICULAR; INTRAMUSCULAR at 08:24

## 2025-07-09 ASSESSMENT — PAIN - FUNCTIONAL ASSESSMENT
PAIN_FUNCTIONAL_ASSESSMENT: 0-10
PAIN_FUNCTIONAL_ASSESSMENT: 0-10

## 2025-07-09 ASSESSMENT — PAIN SCALES - GENERAL
PAINLEVEL_OUTOF10: 9
PAINLEVEL_OUTOF10: 5 - MODERATE PAIN

## 2025-07-09 NOTE — PROGRESS NOTES
Subjective    Patient ID: Niya Guerrero is a 49 y.o. female.    Chief Complaint: Follow-up and Injections of the Left Knee and Follow-up and Injections of the Right Knee           HPI:  Niya Guerrero is a 49 y.o. female with known degenerative changes to both knees who is requesting repeat intra-articular steroid injections.    ROS  Constitutional: No fever, no chills, not feeling tired, no recent weight gain and no recent weight loss  ENT: No nosebleeds  Cardiovascular: No chest pain  Respiratory: No shortness of breath and no cough  Gastrointestinal: No abdominal pain, no nausea, no diarrhea, and no vomiting  Musculoskeletal: No arthralgias  Integumentary: No rashes and no skin lesions  Neurological: No headache  Psychiatric: No sleep disturbances no depression  Endocrine: No muscle weakness and no muscle cramps  Hematologic/lymphatic: No swelling glands and no tendency for easy bruising    Medical History[1]     Surgical History[2]     Current Medications[3]     RX Allergies[4]     Social Connections: Not on file          Objective   49-year-old female well appearing in no acute distress. Alert and oriented ×3.  Skin intact bilateral lower extremities.   Normal tandem gait. Coordination and balance intact.  Bilateral lower extremity compartments supple.  2+ DP/PT pulses bilaterally.  Bilateral knee injection sites are clear    L Inj/Asp: bilateral knee on 7/9/2025 8:24 AM  Indications: pain  Details: 22 G needle, superolateral approach  Medications (Right): 40 mg triamcinolone acetonide 40 mg/mL  Medications (Left): 40 mg triamcinolone acetonide 40 mg/mL            Knee injection:    Right knee intra-articular injection is offered for therapeutic purposes.  Indication is knee pain.  Risks and benefits of the injection were discussed.  After questions were answered, consent was provided to proceed.  Under sterile conditions, the knee was injected through a superolateral patellar site with 40 mg Kenalog and 5 cc  lidocaine without complication.  The patient tolerated the injection well.  A dressing was applied.  Post injection instructions were given.    Left knee intra-articular injection is offered for therapeutic purposes.  Indication is knee pain.  Risks and benefits of the injection were discussed.  After questions were answered, consent was provided to proceed.  Under sterile conditions, the knee was injected through a superolateral patellar site with 40 mg Kenalog and 5 cc lidocaine without complication.  The patient tolerated the injection well.  A dressing was applied.  Post injection instructions were given.      Assessment/Plan   Encounter Diagnoses:  Bilateral primary osteoarthritis of knee    No orders of the defined types were placed in this encounter.      Hopefully she does well with today's injections.  She is planning on having total knee arthroplasty on the left side in November.  We can repeat a right knee injection in 3 months if needed.    This office note was dictated using Dragon voice to text software and was not proofread for spelling or grammatical errors       [1]   Past Medical History:  Diagnosis Date    Arthritis Maybe 12 yrs ago    Osteoarthritis on the knee    Joint pain     Knee pain    Obstructive sleep apnea (adult) (pediatric) 02/23/2017    Obstructive sleep apnea    Personal history of diseases of the skin and subcutaneous tissue 02/04/2015    History of urticaria    Personal history of other diseases of the musculoskeletal system and connective tissue 02/04/2015    History of arthritis    Personal history of other endocrine, nutritional and metabolic disease 01/22/2015    History of hypokalemia    PONV (postoperative nausea and vomiting) 12 yrs ago    Menuscus tear surgery of left knee    Sprain of medial collateral ligament of unspecified knee, initial encounter 04/22/2014    Tear of MCL (medial collateral ligament) of knee    Type 2 diabetes mellitus About 5 yrs    Started with [employer]  program then went to endocrinology    Unspecified tear of unspecified meniscus, current injury, unspecified knee, initial encounter 04/22/2014    Meniscus tear   [2]   Past Surgical History:  Procedure Laterality Date    HYSTEROSCOPY  01/22/2015    Hysteroscopy With Endometrial Ablation    KNEE ARTHROSCOPY W/ DEBRIDEMENT  04/22/2014    Knee Arthroscopy (Therapeutic)    OTHER SURGICAL HISTORY      Meniscus tear on each knee. First ine 12 years ago 2nd knee about 5 yrs ago    TUBAL LIGATION  1999   [3]   Current Outpatient Medications:     atorvastatin (Lipitor) 20 mg tablet, TAKE 1 TABLET BY MOUTH DAILY AS DIRECTED, Disp: 90 tablet, Rfl: 3    ergocalciferol (Vitamin D-2) 1.25 MG (81438 UT) capsule, Take 1 capsule (50,000 Units) by mouth 1 (one) time per week., Disp: , Rfl:     montelukast (Singulair) 10 mg tablet, Take 1 tablet (10 mg) by mouth once daily., Disp: 90 tablet, Rfl: 3    multivitamin tablet, Take 1 tablet by mouth once daily., Disp: , Rfl:     naproxen sodium (Aleve) 220 mg capsule, Take 220 mg by mouth every 12 hours if needed (joint pain)., Disp: , Rfl:     tirzepatide (Mounjaro) 15 mg/0.5 mL pen injector, Inject 15 mg under the skin every 7 days., Disp: 2 mL, Rfl: 2  [4] No Known Allergies

## 2025-07-18 DIAGNOSIS — E11.9 TYPE 2 DIABETES MELLITUS WITHOUT COMPLICATION, WITHOUT LONG-TERM CURRENT USE OF INSULIN: ICD-10-CM

## 2025-07-21 PROCEDURE — RXMED WILLOW AMBULATORY MEDICATION CHARGE

## 2025-07-21 RX ORDER — TIRZEPATIDE 15 MG/.5ML
15 INJECTION, SOLUTION SUBCUTANEOUS
Qty: 2 ML | Refills: 2 | Status: SHIPPED | OUTPATIENT
Start: 2025-07-21

## 2025-07-22 ENCOUNTER — PHARMACY VISIT (OUTPATIENT)
Dept: PHARMACY | Facility: CLINIC | Age: 50
End: 2025-07-22
Payer: COMMERCIAL

## 2025-08-23 PROCEDURE — RXMED WILLOW AMBULATORY MEDICATION CHARGE

## 2025-08-25 ENCOUNTER — PHARMACY VISIT (OUTPATIENT)
Dept: PHARMACY | Facility: CLINIC | Age: 50
End: 2025-08-25
Payer: COMMERCIAL

## 2025-09-02 ENCOUNTER — APPOINTMENT (OUTPATIENT)
Dept: ENDOCRINOLOGY | Facility: CLINIC | Age: 50
End: 2025-09-02
Payer: COMMERCIAL

## 2025-09-05 PROBLEM — E66.3 OVERWEIGHT (BMI 25.0-29.9): Status: ACTIVE | Noted: 2025-09-05

## 2025-09-09 ENCOUNTER — APPOINTMENT (OUTPATIENT)
Dept: OPHTHALMOLOGY | Facility: CLINIC | Age: 50
End: 2025-09-09
Payer: COMMERCIAL

## 2025-10-15 ENCOUNTER — APPOINTMENT (OUTPATIENT)
Dept: ORTHOPEDIC SURGERY | Facility: CLINIC | Age: 50
End: 2025-10-15
Payer: COMMERCIAL

## 2026-06-08 ENCOUNTER — APPOINTMENT (OUTPATIENT)
Dept: ENDOCRINOLOGY | Facility: CLINIC | Age: 51
End: 2026-06-08
Payer: COMMERCIAL